# Patient Record
Sex: FEMALE | Race: WHITE | Employment: OTHER | URBAN - METROPOLITAN AREA
[De-identification: names, ages, dates, MRNs, and addresses within clinical notes are randomized per-mention and may not be internally consistent; named-entity substitution may affect disease eponyms.]

---

## 2021-06-01 PROBLEM — I50.9 CONGESTIVE HEART FAILURE OF UNKNOWN ETIOLOGY (HCC): Status: ACTIVE | Noted: 2021-06-01

## 2021-06-02 LAB — TROPONIN I: 0.02 NG/ML (ref 0.01–0.04)

## 2021-06-03 ENCOUNTER — APPOINTMENT (OUTPATIENT)
Dept: ULTRASOUND IMAGING | Age: 81
DRG: 246 | End: 2021-06-03
Attending: FAMILY MEDICINE
Payer: MEDICARE

## 2021-06-03 ENCOUNTER — HOSPITAL ENCOUNTER (INPATIENT)
Age: 81
LOS: 3 days | Discharge: HOME | DRG: 246 | End: 2021-06-06
Attending: FAMILY MEDICINE | Admitting: INTERNAL MEDICINE
Payer: MEDICARE

## 2021-06-03 ENCOUNTER — APPOINTMENT (OUTPATIENT)
Dept: CARDIAC CATH/INVASIVE PROCEDURES | Age: 81
DRG: 246 | End: 2021-06-03
Attending: FAMILY MEDICINE
Payer: MEDICARE

## 2021-06-03 ENCOUNTER — APPOINTMENT (OUTPATIENT)
Dept: GENERAL RADIOLOGY | Age: 81
DRG: 246 | End: 2021-06-03
Attending: FAMILY MEDICINE
Payer: MEDICARE

## 2021-06-03 DIAGNOSIS — I50.43 ACUTE ON CHRONIC COMBINED SYSTOLIC (CONGESTIVE) AND DIASTOLIC (CONGESTIVE) HEART FAILURE (HCC): Primary | ICD-10-CM

## 2021-06-03 PROBLEM — I48.91 ATRIAL FIBRILLATION WITH RAPID VENTRICULAR RESPONSE (HCC): Status: ACTIVE | Noted: 2021-06-03

## 2021-06-03 PROBLEM — I20.0 UNSTABLE ANGINA (HCC): Status: ACTIVE | Noted: 2021-06-03

## 2021-06-03 PROBLEM — E43 SEVERE MALNUTRITION (HCC): Status: ACTIVE | Noted: 2021-06-03

## 2021-06-03 LAB
-: NORMAL
AMORPHOUS: NORMAL
ANION GAP SERPL CALCULATED.3IONS-SCNC: 18 MMOL/L (ref 9–17)
ANION GAP SERPL CALCULATED.3IONS-SCNC: 19 MMOL/L (ref 9–17)
ANION GAP SERPL CALCULATED.3IONS-SCNC: 20 MMOL/L (ref 9–17)
BACTERIA: NORMAL
BILIRUBIN URINE: NEGATIVE
BNP INTERPRETATION: ABNORMAL
BUN BLDV-MCNC: 14 MG/DL (ref 8–23)
BUN BLDV-MCNC: 15 MG/DL (ref 8–23)
BUN BLDV-MCNC: 16 MG/DL (ref 8–23)
BUN/CREAT BLD: ABNORMAL (ref 9–20)
CALCIUM SERPL-MCNC: 7.9 MG/DL (ref 8.6–10.4)
CALCIUM SERPL-MCNC: 7.9 MG/DL (ref 8.6–10.4)
CALCIUM SERPL-MCNC: 8 MG/DL (ref 8.6–10.4)
CASTS UA: NORMAL /LPF (ref 0–8)
CHLORIDE BLD-SCNC: 96 MMOL/L (ref 98–107)
CHLORIDE BLD-SCNC: 97 MMOL/L (ref 98–107)
CHLORIDE BLD-SCNC: 97 MMOL/L (ref 98–107)
CHOLESTEROL/HDL RATIO: 2.1
CHOLESTEROL: 106 MG/DL
CO2: 16 MMOL/L (ref 20–31)
CO2: 17 MMOL/L (ref 20–31)
CO2: 21 MMOL/L (ref 20–31)
COLOR: YELLOW
COMMENT UA: ABNORMAL
CREAT SERPL-MCNC: 1.02 MG/DL (ref 0.5–0.9)
CREAT SERPL-MCNC: 1.12 MG/DL (ref 0.5–0.9)
CREAT SERPL-MCNC: 1.19 MG/DL (ref 0.5–0.9)
CRYSTALS, UA: NORMAL /HPF
EPITHELIAL CELLS UA: NORMAL /HPF (ref 0–5)
FREE KAPPA/LAMBDA RATIO: 0.91 (ref 0.26–1.65)
GFR AFRICAN AMERICAN: 53 ML/MIN
GFR AFRICAN AMERICAN: 57 ML/MIN
GFR AFRICAN AMERICAN: >60 ML/MIN
GFR NON-AFRICAN AMERICAN: 44 ML/MIN
GFR NON-AFRICAN AMERICAN: 47 ML/MIN
GFR NON-AFRICAN AMERICAN: 52 ML/MIN
GFR SERPL CREATININE-BSD FRML MDRD: ABNORMAL ML/MIN/{1.73_M2}
GLUCOSE BLD-MCNC: 121 MG/DL (ref 65–105)
GLUCOSE BLD-MCNC: 122 MG/DL (ref 65–105)
GLUCOSE BLD-MCNC: 129 MG/DL (ref 70–99)
GLUCOSE BLD-MCNC: 145 MG/DL (ref 65–105)
GLUCOSE BLD-MCNC: 145 MG/DL (ref 70–99)
GLUCOSE BLD-MCNC: 182 MG/DL (ref 70–99)
GLUCOSE URINE: NEGATIVE
HCT VFR BLD CALC: 44.8 % (ref 36.3–47.1)
HDLC SERPL-MCNC: 50 MG/DL
HEMOGLOBIN: 13.7 G/DL (ref 11.9–15.1)
KAPPA FREE LIGHT CHAINS QNT: 2.03 MG/DL (ref 0.37–1.94)
KETONES, URINE: NEGATIVE
LAMBDA FREE LIGHT CHAINS QNT: 2.22 MG/DL (ref 0.57–2.63)
LDL CHOLESTEROL: 43 MG/DL (ref 0–130)
LEUKOCYTE ESTERASE, URINE: NEGATIVE
MAGNESIUM: 1.7 MG/DL (ref 1.6–2.6)
MCH RBC QN AUTO: 31.6 PG (ref 25.2–33.5)
MCHC RBC AUTO-ENTMCNC: 30.6 G/DL (ref 28.4–34.8)
MCV RBC AUTO: 103.5 FL (ref 82.6–102.9)
MUCUS: NORMAL
NITRITE, URINE: NEGATIVE
NRBC AUTOMATED: 0 PER 100 WBC
OTHER OBSERVATIONS UA: NORMAL
PARTIAL THROMBOPLASTIN TIME: 25.8 SEC (ref 20.5–30.5)
PARTIAL THROMBOPLASTIN TIME: 32.9 SEC (ref 20.5–30.5)
PDW BLD-RTO: 14.1 % (ref 11.8–14.4)
PH UA: 5 (ref 5–8)
PLATELET # BLD: 205 K/UL (ref 138–453)
PMV BLD AUTO: 9.2 FL (ref 8.1–13.5)
POTASSIUM SERPL-SCNC: 4.8 MMOL/L (ref 3.7–5.3)
POTASSIUM SERPL-SCNC: 6.1 MMOL/L (ref 3.7–5.3)
POTASSIUM SERPL-SCNC: 6.3 MMOL/L (ref 3.7–5.3)
PRO-BNP: 3847 PG/ML
PROTEIN UA: ABNORMAL
RBC # BLD: 4.33 M/UL (ref 3.95–5.11)
RBC UA: NORMAL /HPF (ref 0–4)
RENAL EPITHELIAL, UA: NORMAL /HPF
SODIUM BLD-SCNC: 132 MMOL/L (ref 135–144)
SODIUM BLD-SCNC: 133 MMOL/L (ref 135–144)
SODIUM BLD-SCNC: 136 MMOL/L (ref 135–144)
SPECIFIC GRAVITY UA: 1.01 (ref 1–1.03)
TRICHOMONAS: NORMAL
TRIGL SERPL-MCNC: 63 MG/DL
TROPONIN INTERP: ABNORMAL
TROPONIN T: ABNORMAL NG/ML
TROPONIN, HIGH SENSITIVITY: 36 NG/L (ref 0–14)
TURBIDITY: CLEAR
URINE HGB: ABNORMAL
UROBILINOGEN, URINE: NORMAL
VLDLC SERPL CALC-MCNC: NORMAL MG/DL (ref 1–30)
WBC # BLD: 11.7 K/UL (ref 3.5–11.3)
WBC UA: NORMAL /HPF (ref 0–5)
YEAST: NORMAL

## 2021-06-03 PROCEDURE — 99223 1ST HOSP IP/OBS HIGH 75: CPT | Performed by: FAMILY MEDICINE

## 2021-06-03 PROCEDURE — 2500000003 HC RX 250 WO HCPCS: Performed by: FAMILY MEDICINE

## 2021-06-03 PROCEDURE — 84165 PROTEIN E-PHORESIS SERUM: CPT

## 2021-06-03 PROCEDURE — 84484 ASSAY OF TROPONIN QUANT: CPT

## 2021-06-03 PROCEDURE — 2060000000 HC ICU INTERMEDIATE R&B

## 2021-06-03 PROCEDURE — 6360000002 HC RX W HCPCS: Performed by: FAMILY MEDICINE

## 2021-06-03 PROCEDURE — 71045 X-RAY EXAM CHEST 1 VIEW: CPT

## 2021-06-03 PROCEDURE — 83880 ASSAY OF NATRIURETIC PEPTIDE: CPT

## 2021-06-03 PROCEDURE — 2580000003 HC RX 258: Performed by: INTERNAL MEDICINE

## 2021-06-03 PROCEDURE — 82947 ASSAY GLUCOSE BLOOD QUANT: CPT

## 2021-06-03 PROCEDURE — 1200000000 HC SEMI PRIVATE

## 2021-06-03 PROCEDURE — 6360000002 HC RX W HCPCS: Performed by: INTERNAL MEDICINE

## 2021-06-03 PROCEDURE — 83883 ASSAY NEPHELOMETRY NOT SPEC: CPT

## 2021-06-03 PROCEDURE — 85730 THROMBOPLASTIN TIME PARTIAL: CPT

## 2021-06-03 PROCEDURE — 6370000000 HC RX 637 (ALT 250 FOR IP): Performed by: FAMILY MEDICINE

## 2021-06-03 PROCEDURE — 6370000000 HC RX 637 (ALT 250 FOR IP): Performed by: INTERNAL MEDICINE

## 2021-06-03 PROCEDURE — 81001 URINALYSIS AUTO W/SCOPE: CPT

## 2021-06-03 PROCEDURE — 99222 1ST HOSP IP/OBS MODERATE 55: CPT | Performed by: INTERNAL MEDICINE

## 2021-06-03 PROCEDURE — 76770 US EXAM ABDO BACK WALL COMP: CPT

## 2021-06-03 PROCEDURE — 84155 ASSAY OF PROTEIN SERUM: CPT

## 2021-06-03 PROCEDURE — 80061 LIPID PANEL: CPT

## 2021-06-03 PROCEDURE — 36415 COLL VENOUS BLD VENIPUNCTURE: CPT

## 2021-06-03 PROCEDURE — 85027 COMPLETE CBC AUTOMATED: CPT

## 2021-06-03 PROCEDURE — 80048 BASIC METABOLIC PNL TOTAL CA: CPT

## 2021-06-03 PROCEDURE — 83735 ASSAY OF MAGNESIUM: CPT

## 2021-06-03 PROCEDURE — 2580000003 HC RX 258: Performed by: FAMILY MEDICINE

## 2021-06-03 RX ORDER — HEPARIN SODIUM 10000 [USP'U]/100ML
5-30 INJECTION, SOLUTION INTRAVENOUS CONTINUOUS
Status: DISCONTINUED | OUTPATIENT
Start: 2021-06-03 | End: 2021-06-04

## 2021-06-03 RX ORDER — FLUDROCORTISONE ACETATE 0.1 MG/1
0.1 TABLET ORAL DAILY
COMMUNITY

## 2021-06-03 RX ORDER — NICOTINE POLACRILEX 4 MG
15 LOZENGE BUCCAL PRN
Status: DISCONTINUED | OUTPATIENT
Start: 2021-06-03 | End: 2021-06-06 | Stop reason: HOSPADM

## 2021-06-03 RX ORDER — MECLIZINE HCL 12.5 MG/1
25 TABLET ORAL NIGHTLY
Status: DISCONTINUED | OUTPATIENT
Start: 2021-06-03 | End: 2021-06-06 | Stop reason: HOSPADM

## 2021-06-03 RX ORDER — ACETAMINOPHEN 325 MG/1
650 TABLET ORAL EVERY 6 HOURS PRN
Status: DISCONTINUED | OUTPATIENT
Start: 2021-06-03 | End: 2021-06-06 | Stop reason: HOSPADM

## 2021-06-03 RX ORDER — POLYETHYLENE GLYCOL 3350 17 G/17G
17 POWDER, FOR SOLUTION ORAL DAILY PRN
Status: DISCONTINUED | OUTPATIENT
Start: 2021-06-03 | End: 2021-06-06 | Stop reason: HOSPADM

## 2021-06-03 RX ORDER — CARVEDILOL 6.25 MG/1
6.25 TABLET ORAL 2 TIMES DAILY WITH MEALS
Status: ON HOLD | COMMUNITY
End: 2021-06-06 | Stop reason: HOSPADM

## 2021-06-03 RX ORDER — FUROSEMIDE 10 MG/ML
20 INJECTION INTRAMUSCULAR; INTRAVENOUS ONCE
Status: COMPLETED | OUTPATIENT
Start: 2021-06-03 | End: 2021-06-03

## 2021-06-03 RX ORDER — NICOTINE 21 MG/24HR
1 PATCH, TRANSDERMAL 24 HOURS TRANSDERMAL DAILY PRN
Status: DISCONTINUED | OUTPATIENT
Start: 2021-06-03 | End: 2021-06-06 | Stop reason: HOSPADM

## 2021-06-03 RX ORDER — PRAVASTATIN SODIUM 10 MG
10 TABLET ORAL DAILY
COMMUNITY

## 2021-06-03 RX ORDER — HEPARIN SODIUM 1000 [USP'U]/ML
60 INJECTION, SOLUTION INTRAVENOUS; SUBCUTANEOUS PRN
Status: DISCONTINUED | OUTPATIENT
Start: 2021-06-03 | End: 2021-06-04

## 2021-06-03 RX ORDER — SODIUM CHLORIDE 9 MG/ML
25 INJECTION, SOLUTION INTRAVENOUS PRN
Status: DISCONTINUED | OUTPATIENT
Start: 2021-06-03 | End: 2021-06-06 | Stop reason: HOSPADM

## 2021-06-03 RX ORDER — FUROSEMIDE 10 MG/ML
40 INJECTION INTRAMUSCULAR; INTRAVENOUS 2 TIMES DAILY
Status: DISCONTINUED | OUTPATIENT
Start: 2021-06-03 | End: 2021-06-03

## 2021-06-03 RX ORDER — DEXTROSE MONOHYDRATE 50 MG/ML
100 INJECTION, SOLUTION INTRAVENOUS PRN
Status: DISCONTINUED | OUTPATIENT
Start: 2021-06-03 | End: 2021-06-06 | Stop reason: HOSPADM

## 2021-06-03 RX ORDER — LISINOPRIL 5 MG/1
5 TABLET ORAL DAILY
Status: DISCONTINUED | OUTPATIENT
Start: 2021-06-04 | End: 2021-06-03

## 2021-06-03 RX ORDER — SODIUM CHLORIDE 9 MG/ML
INJECTION, SOLUTION INTRAVENOUS CONTINUOUS
Status: DISCONTINUED | OUTPATIENT
Start: 2021-06-03 | End: 2021-06-05

## 2021-06-03 RX ORDER — ASPIRIN 325 MG
325 TABLET ORAL DAILY
Status: ON HOLD | COMMUNITY
End: 2021-06-06 | Stop reason: HOSPADM

## 2021-06-03 RX ORDER — SODIUM CHLORIDE 0.9 % (FLUSH) 0.9 %
5-40 SYRINGE (ML) INJECTION EVERY 12 HOURS SCHEDULED
Status: DISCONTINUED | OUTPATIENT
Start: 2021-06-03 | End: 2021-06-06 | Stop reason: HOSPADM

## 2021-06-03 RX ORDER — HEPARIN SODIUM 1000 [USP'U]/ML
60 INJECTION, SOLUTION INTRAVENOUS; SUBCUTANEOUS ONCE
Status: COMPLETED | OUTPATIENT
Start: 2021-06-03 | End: 2021-06-03

## 2021-06-03 RX ORDER — PRAVASTATIN SODIUM 20 MG
10 TABLET ORAL DAILY
Status: DISCONTINUED | OUTPATIENT
Start: 2021-06-03 | End: 2021-06-06 | Stop reason: HOSPADM

## 2021-06-03 RX ORDER — DIGOXIN 0.25 MG/ML
125 INJECTION INTRAMUSCULAR; INTRAVENOUS DAILY
Status: DISCONTINUED | OUTPATIENT
Start: 2021-06-03 | End: 2021-06-05

## 2021-06-03 RX ORDER — ASPIRIN 325 MG
325 TABLET ORAL DAILY
Status: DISCONTINUED | OUTPATIENT
Start: 2021-06-03 | End: 2021-06-04

## 2021-06-03 RX ORDER — PROMETHAZINE HYDROCHLORIDE 12.5 MG/1
12.5 TABLET ORAL EVERY 6 HOURS PRN
Status: DISCONTINUED | OUTPATIENT
Start: 2021-06-03 | End: 2021-06-06 | Stop reason: HOSPADM

## 2021-06-03 RX ORDER — DEXTROSE MONOHYDRATE 25 G/50ML
12.5 INJECTION, SOLUTION INTRAVENOUS PRN
Status: DISCONTINUED | OUTPATIENT
Start: 2021-06-03 | End: 2021-06-06 | Stop reason: HOSPADM

## 2021-06-03 RX ORDER — MECLIZINE HYDROCHLORIDE 25 MG/1
25 TABLET ORAL NIGHTLY
COMMUNITY

## 2021-06-03 RX ORDER — OMEPRAZOLE 20 MG/1
20 CAPSULE, DELAYED RELEASE ORAL DAILY
COMMUNITY

## 2021-06-03 RX ORDER — CARVEDILOL 3.12 MG/1
3.12 TABLET ORAL 2 TIMES DAILY WITH MEALS
Status: DISCONTINUED | OUTPATIENT
Start: 2021-06-04 | End: 2021-06-06 | Stop reason: HOSPADM

## 2021-06-03 RX ORDER — FLUDROCORTISONE ACETATE 0.1 MG/1
0.1 TABLET ORAL DAILY
Status: DISCONTINUED | OUTPATIENT
Start: 2021-06-03 | End: 2021-06-06 | Stop reason: HOSPADM

## 2021-06-03 RX ORDER — SODIUM POLYSTYRENE SULFONATE 15 G/60ML
15 SUSPENSION ORAL; RECTAL ONCE
Status: COMPLETED | OUTPATIENT
Start: 2021-06-03 | End: 2021-06-03

## 2021-06-03 RX ORDER — FAMOTIDINE 20 MG/1
20 TABLET, FILM COATED ORAL DAILY
Status: DISCONTINUED | OUTPATIENT
Start: 2021-06-03 | End: 2021-06-06 | Stop reason: HOSPADM

## 2021-06-03 RX ORDER — ACETAMINOPHEN 650 MG/1
650 SUPPOSITORY RECTAL EVERY 6 HOURS PRN
Status: DISCONTINUED | OUTPATIENT
Start: 2021-06-03 | End: 2021-06-06 | Stop reason: HOSPADM

## 2021-06-03 RX ORDER — DEXTROSE MONOHYDRATE 25 G/50ML
25 INJECTION, SOLUTION INTRAVENOUS ONCE
Status: COMPLETED | OUTPATIENT
Start: 2021-06-03 | End: 2021-06-03

## 2021-06-03 RX ORDER — PANTOPRAZOLE SODIUM 40 MG/1
40 TABLET, DELAYED RELEASE ORAL
Status: DISCONTINUED | OUTPATIENT
Start: 2021-06-04 | End: 2021-06-06 | Stop reason: HOSPADM

## 2021-06-03 RX ORDER — DEXTROSE MONOHYDRATE 25 G/50ML
25 INJECTION, SOLUTION INTRAVENOUS ONCE
Status: DISCONTINUED | OUTPATIENT
Start: 2021-06-03 | End: 2021-06-06 | Stop reason: HOSPADM

## 2021-06-03 RX ORDER — ONDANSETRON 2 MG/ML
4 INJECTION INTRAMUSCULAR; INTRAVENOUS EVERY 6 HOURS PRN
Status: DISCONTINUED | OUTPATIENT
Start: 2021-06-03 | End: 2021-06-06 | Stop reason: HOSPADM

## 2021-06-03 RX ORDER — SODIUM CHLORIDE 0.9 % (FLUSH) 0.9 %
10 SYRINGE (ML) INJECTION PRN
Status: DISCONTINUED | OUTPATIENT
Start: 2021-06-03 | End: 2021-06-06 | Stop reason: HOSPADM

## 2021-06-03 RX ORDER — HEPARIN SODIUM 1000 [USP'U]/ML
30 INJECTION, SOLUTION INTRAVENOUS; SUBCUTANEOUS PRN
Status: DISCONTINUED | OUTPATIENT
Start: 2021-06-03 | End: 2021-06-04

## 2021-06-03 RX ADMIN — HEPARIN SODIUM 1470 UNITS: 1000 INJECTION INTRAVENOUS; SUBCUTANEOUS at 18:17

## 2021-06-03 RX ADMIN — HEPARIN SODIUM AND DEXTROSE 12 UNITS/KG/HR: 10000; 5 INJECTION INTRAVENOUS at 11:31

## 2021-06-03 RX ADMIN — ENOXAPARIN SODIUM 40 MG: 40 INJECTION, SOLUTION INTRAVENOUS; SUBCUTANEOUS at 09:45

## 2021-06-03 RX ADMIN — DEXTROSE MONOHYDRATE 25 G: 25 INJECTION, SOLUTION INTRAVENOUS at 11:44

## 2021-06-03 RX ADMIN — HEPARIN SODIUM 2940 UNITS: 1000 INJECTION INTRAVENOUS; SUBCUTANEOUS at 11:22

## 2021-06-03 RX ADMIN — FAMOTIDINE 20 MG: 20 TABLET, FILM COATED ORAL at 09:45

## 2021-06-03 RX ADMIN — AMIODARONE HYDROCHLORIDE 0.5 MG/MIN: 150 INJECTION, SOLUTION INTRAVENOUS at 10:02

## 2021-06-03 RX ADMIN — INSULIN HUMAN 10 UNITS: 100 INJECTION, SOLUTION PARENTERAL at 11:45

## 2021-06-03 RX ADMIN — PRAVASTATIN SODIUM 10 MG: 20 TABLET ORAL at 17:22

## 2021-06-03 RX ADMIN — SODIUM CHLORIDE: 9 INJECTION, SOLUTION INTRAVENOUS at 11:22

## 2021-06-03 RX ADMIN — SODIUM CHLORIDE, PRESERVATIVE FREE 10 ML: 5 INJECTION INTRAVENOUS at 20:08

## 2021-06-03 RX ADMIN — FUROSEMIDE 40 MG: 10 INJECTION, SOLUTION INTRAMUSCULAR; INTRAVENOUS at 09:45

## 2021-06-03 RX ADMIN — ASPIRIN 325 MG: 325 TABLET ORAL at 17:21

## 2021-06-03 RX ADMIN — FLUDROCORTISONE ACETATE 0.1 MG: 0.1 TABLET ORAL at 17:21

## 2021-06-03 RX ADMIN — SODIUM CHLORIDE, PRESERVATIVE FREE 10 ML: 5 INJECTION INTRAVENOUS at 09:46

## 2021-06-03 RX ADMIN — SODIUM POLYSTYRENE SULFONATE 15 G: 15 SUSPENSION ORAL; RECTAL at 11:45

## 2021-06-03 RX ADMIN — AMIODARONE HYDROCHLORIDE 1 MG/MIN: 50 INJECTION, SOLUTION INTRAVENOUS at 04:39

## 2021-06-03 RX ADMIN — ONDANSETRON 4 MG: 2 INJECTION INTRAMUSCULAR; INTRAVENOUS at 06:35

## 2021-06-03 RX ADMIN — FUROSEMIDE 20 MG: 10 INJECTION, SOLUTION INTRAMUSCULAR; INTRAVENOUS at 17:24

## 2021-06-03 RX ADMIN — MECLIZINE HYDROCHLORIDE 25 MG: 12.5 TABLET, FILM COATED ORAL at 20:07

## 2021-06-03 ASSESSMENT — ENCOUNTER SYMPTOMS
CONSTIPATION: 0
ABDOMINAL PAIN: 0
VOMITING: 0
BLOOD IN STOOL: 0
CHEST TIGHTNESS: 0
RHINORRHEA: 0
WHEEZING: 0
NAUSEA: 0
DIARRHEA: 0
COUGH: 0
SHORTNESS OF BREATH: 1
TACHYPNEA: 1

## 2021-06-03 NOTE — H&P
[facility]  Office: [address], DO, Judson Gila, DO, Francesca Dejesus, DO, Floridalma Chen Blood, DO, Melanie Salmon MD, Ladonna Elizabeth MD, Aguila Howard MD, Jeb Kidd MD, Judy Hinds MD, Sunil Celestin MD, Balta Morrissey MD, Christopher Pantoja MD, Fady Hughes, DO, Carmelo Edwards MD, Allegra Bacon, DO, Cordelia Barthel, MD,  Bhargav Aly, DO, Keke Lainez MD, Rob Madrid MD, Jean Paul Ramos MD, Valerie Kearney MD, Dulce Spencer, [facility], [facility] Jayme, CNP, Reanna Gutierrez, CNP, Dandy Joe, CNS, Merna Bey, CNP, Jin Haynes, CNP, Enedina Forde, CNP, Angeline Franco, CNP, Laverne Hsu, CNP, Yohana Self PA-C, Marya Chang, [facility], Sekou Carlos, CNP, Hermelindo Terry, CNP, Clement Magaña, CNP, Spring Maza, CNP, Jerri Dyer, CNP, [facility], [address] [place] [address]      HISTORY AND PHYSICAL EXAMINATION            Date:   6/3/2021  Patient name:  Roderick Vallejo  Date of admission:  6/3/2021  1:23 AM  MRN:   [id number]  Account:  [de-identified]  YOB: 1940  PCP:    Tammy Segovia DO  Room:   2018/2018-01  Code Status:    DNR-CCA    Chief Complaint:     Breathing difficulty    History Obtained From:     patient, electronic medical record    History of Present Illness: The patient is a 79 y.o. Non-/non  female who presents with breathing difficulty and she is admitted to the hospital for the management of  Atrial fibrillation with rapid ventricular response ([facility] [address] Patient was transferred from [facility] where she had been admitted for 5 days after presenting with difficulty with difficulty in breathing. Patient reported that she had not been feeling good for about a week and had decreased activity, fatigue. She also reported chest pain on mild exertion. Patient denied any fever, cough, expectoration, nausea, vomiting.   She has known history of CAD with multiple stents, chronic systolic heart failure with EF 25 to oropharyngeal exudate. Eyes:      General: No scleral icterus. Conjunctiva/sclera: Conjunctivae normal.      Pupils: Pupils are equal, round, and reactive to light. Neck:      Thyroid: No thyromegaly. Vascular: No JVD. Cardiovascular:      Rate and Rhythm: Normal rate and regular rhythm. Pulses:           Dorsalis pedis pulses are 2+ on the right side and 2+ on the left side. Heart sounds: Normal heart sounds. No murmur heard. Pulmonary:      Effort: Pulmonary effort is normal.      Breath sounds: Normal breath sounds. No wheezing or rales. Abdominal:      Palpations: Abdomen is soft. There is no mass. Tenderness: There is no abdominal tenderness. Musculoskeletal:      Cervical back: Full passive range of motion without pain and neck supple. Lymphadenopathy:      Head:      Right side of head: No submandibular adenopathy. Left side of head: No submandibular adenopathy. Cervical: No cervical adenopathy. Skin:     General: Skin is warm. Neurological:      Mental Status: She is alert and oriented to person, place, and time. Motor: No tremor. Psychiatric:         Behavior: Behavior is cooperative.          Investigations:      Laboratory Testing:  Recent Results (from the past 24 hour(s))   Troponin    Collection Time: 06/02/21 10:35 PM   Result Value Ref Range    Troponin I 0.016 0.010 - 0.04 ng/mL   Brain Natriuretic Peptide    Collection Time: 06/03/21  7:08 AM   Result Value Ref Range    Pro-BNP 3,847 (H) <300 pg/mL    BNP Interpretation Pro-BNP Reference Range:    CBC    Collection Time: 06/03/21  7:08 AM   Result Value Ref Range    WBC 11.7 (H) 3.5 - 11.3 k/uL    RBC 4.33 3.95 - 5.11 m/uL    Hemoglobin 13.7 11.9 - 15.1 g/dL    Hematocrit 44.8 36.3 - 47.1 %    .5 (H) 82.6 - 102.9 fL    MCH 31.6 25.2 - 33.5 pg    MCHC 30.6 28.4 - 34.8 g/dL    RDW 14.1 11.8 - 14.4 %    Platelets 417 503 - 595 k/uL    MPV 9.2 8.1 - 13.5 fL    NRBC Automated 0.0 0.0 per 100 WBC   Lipid panel - fasting    Collection Time: 06/03/21  7:08 AM   Result Value Ref Range    Cholesterol 106 <200 mg/dL    HDL 50 >40 mg/dL    LDL Cholesterol 43 0 - 130 mg/dL    Chol/HDL Ratio 2.1 <5    Triglycerides 63 <150 mg/dL    VLDL NOT REPORTED 1 - 30 mg/dL   Magnesium    Collection Time: 06/03/21  7:08 AM   Result Value Ref Range    Magnesium 1.7 1.6 - 2.6 mg/dL   BASIC METABOLIC PANEL    Collection Time: 06/03/21  7:08 AM   Result Value Ref Range    Glucose 182 (H) 70 - 99 mg/dL    BUN 14 8 - 23 mg/dL    CREATININE 1.12 (H) 0.50 - 0.90 mg/dL    Bun/Cre Ratio NOT REPORTED 9 - 20    Calcium 8.0 (L) 8.6 - 10.4 mg/dL    Sodium 133 (L) 135 - 144 mmol/L    Potassium 6.1 (HH) 3.7 - 5.3 mmol/L    Chloride 97 (L) 98 - 107 mmol/L    CO2 16 (L) 20 - 31 mmol/L    Anion Gap 20 (H) 9 - 17 mmol/L    GFR Non-African American 47 (L) >60 mL/min    GFR  57 (L) >60 mL/min    GFR Comment          GFR Staging NOT REPORTED    BASIC METABOLIC PANEL    Collection Time: 06/03/21 10:38 AM   Result Value Ref Range    Glucose 145 (H) 70 - 99 mg/dL    BUN 15 8 - 23 mg/dL    CREATININE 1.02 (H) 0.50 - 0.90 mg/dL    Bun/Cre Ratio NOT REPORTED 9 - 20    Calcium 7.9 (L) 8.6 - 10.4 mg/dL    Sodium 132 (L) 135 - 144 mmol/L    Potassium 6.3 (HH) 3.7 - 5.3 mmol/L    Chloride 96 (L) 98 - 107 mmol/L    CO2 17 (L) 20 - 31 mmol/L    Anion Gap 19 (H) 9 - 17 mmol/L    GFR Non-African American 52 (L) >60 mL/min    GFR African American >60 >60 mL/min    GFR Comment          GFR Staging NOT REPORTED    APTT    Collection Time: 06/03/21 10:38 AM   Result Value Ref Range    PTT 25.8 20.5 - 30.5 sec   Troponin    Collection Time: 06/03/21 10:38 AM   Result Value Ref Range    Troponin, High Sensitivity 36 (H) 0 - 14 ng/L    Troponin T NOT REPORTED <0.03 ng/mL    Troponin Interp NOT REPORTED    POC Glucose Fingerstick    Collection Time: 06/03/21 11:40 AM   Result Value Ref Range    POC Glucose 122 (H) 65 - 105 mg/dL Imaging/Diagonstics:    No results found. Assessment :      Primary Problem  Atrial fibrillation with rapid ventricular response [facility])    Active Hospital Problems    Diagnosis Date Noted    Atrial fibrillation with rapid ventricular response (HCC) [I48.91] 06/03/2021    Unstable angina ([facility] [address] [I20.0] 06/03/2021    CAD (coronary artery disease) [I25.10]     Hypertension [I10]     Hyperlipidemia [E78.5]     Hypothyroidism [E03.9]     Type 2 diabetes mellitus ([facility] [address] [E11.9]     Acute on chronic combined systolic (congestive) and diastolic (congestive) heart failure [facility] [I50.43] 06/01/2021       Plan:     Patient status Admit as inpatient in the  Progressive Unit/Step down    1. Acute decompensated systolic heart failure secondary to A. fib RVR -heart rate controlled with amiodarone infusion. Continue heparin infusion. Cardiology consult. Plan for heart cath. Does not appear in acute fluid overload. Patient has decreased urine output and has Veloz catheter in place will start on normal saline at 50 mill per hour. 2. Unstable angina -chest pain-free at this time. Continue heparin. Plan for heart cath . continue aspirin, Coreg. 3. CAD with multiple stents -continue Coreg, Lipitor, aspirin. 4. Hyperlipidemia -statins  5. Hypothyroidism-check TSH. 6. Diet-controlled diabetes mellitus-monitor blood sugar. Humalog as needed. Check A1c.  7. Chronic hypertension on Florinef -   8. Acute hyperkalemia -Kayexalate, insulin with dextrose. Monitor potassium level. Consultations:   IP CONSULT TO HEART FAILURE NURSE/COORDINATOR  IP CONSULT TO DIETITIAN  IP CONSULT TO CARDIOLOGY  IP CONSULT TO IV TEAM    Patient is admitted as inpatient status because of co-morbidities listed above, severity of signs and symptoms as outlined, requirement for current medical therapies and most importantly because of direct risk to patient if care not provided in a hospital setting.     Angelique Pruett MD  6/3/2021    Copy sent to Dr. Bryanna Rouse DO    (Please note that portions of this note were completed with a voice recognition program. Efforts were made to edit the dictations but occasionally words are mis-transcribed.)

## 2021-06-03 NOTE — PROGRESS NOTES
Comprehensive Nutrition Assessment    Type and Reason for Visit:  Initial (PNS: Wt loss, poor appetite; Consult - Cardiac Ed)    Nutrition Recommendations/Plan:   -Continue NPO status   -Start diet as able   -Suggest nepro supplement QD per pt request   -Please obtain actual wt as able   -Will monitor nutrition progression     Nutrition Assessment:  Pt admitted d/t acute decompensated systolic HF 2/2 to BEN navas RVR. Pt stated that her weight is usually around 100 lbs. Writer weighed pt on bed-scale at 115 lbs - pt denies ever weighing this much. No wt hx in EMR. Pt stated that her appetite has always been poor - which has become her baseline. Pt meets the criteria for severe malnutrition. Pt agreed to one nutritional supplement on her meal tray when her diet advances. Cardiac diet ed not appropriate at this time. Goal of wt stabilization/gain as medically able. Will monitor. Malnutrition Assessment:  Malnutrition Status:  Severe malnutrition    Context:  Chronic Illness     Findings of the 6 clinical characteristics of malnutrition:  Energy Intake:   (<50% of est'd needs > 1 mo)  Weight Loss:  Unable to assess     Body Fat Loss:  7 - Severe body fat loss Orbital, Triceps   Muscle Mass Loss:  7 - Severe muscle mass loss Temples (temporalis), Clavicles (pectoralis & deltoids), Scapula (trapezius)  Fluid Accumulation:  No significant fluid accumulation     Strength:  Not Performed    Estimated Daily Nutrient Needs:  Energy (kcal):  30-35 ~> 2803-5083 kcals/d; Weight Used for Energy Requirements:  Usual     Protein (g):  1.3-1.5 gm/kg ~> 59-68 gms/d; Weight Used for Protein Requirements:  Usual        Fluid (ml/day):  30-35 ~> 5130-7251 mLs/d; Method Used for Fluid Requirements:  1 ml/kcal      Nutrition Related Findings:  active bowel sounds;  Na 132, K 6.3, glucose 145; meds reviewed      Wounds:  None       Current Nutrition Therapies:    Diet NPO    Anthropometric Measures:  · Height: 5' 3\" (160 cm)  · Current Body Weight: 115 lb (52.2 kg) (writer weighed pt using bed-scale)   · Usual Body Weight: 100 lb (45.4 kg) (per pt)     · Ideal Body Weight: 115 lbs; % Ideal Body Weight 100 %   · BMI: 20.4  · BMI Categories: Underweight (BMI less than 22) age over 72       Nutrition Diagnosis:   · Severe malnutrition, In context of chronic illness related to cardiac dysfunction (advanced age) as evidenced by poor intake prior to admission, NPO or clear liquid status due to medical condition, severe loss of subcutaneous fat, severe muscle loss (Need for ONS)      Nutrition Interventions:   Food and/or Nutrient Delivery:  Continue NPO (Start diet as able; suggest nepro supplements QD per pt request)  Nutrition Education/Counseling:  Education not appropriate   Coordination of Nutrition Care:  Continue to monitor while inpatient    Goals: Set   Start diet within 24-72 hrs       Nutrition Monitoring and Evaluation:   Food/Nutrient Intake Outcomes:  Diet Advancement/Tolerance  Physical Signs/Symptoms Outcomes:  Biochemical Data, Nutrition Focused Physical Findings, Skin, Weight, GI Status, Fluid Status or Edema     Discharge Planning:     Too soon to determine     Electronically signed by Emigdio Granados RD, LD on 6/3/21 at 12:58 PM EDT    Contact: [phone]

## 2021-06-03 NOTE — CARE COORDINATION
Case Management Initial Discharge Plan  Sonu Winters             Met with:patient to discuss discharge plans. Information verified: address, contacts, phone number, , insurance Yes    Emergency Contact/Next of Kin name & number:  Tobias Heading [phone]    PCP: Caden Miranda  Date of last visit: 2 months ago    Insurance Provider: Medicare    Discharge Planning    Living Arrangements:  Spouse/Significant Other   Support Systems:  Spouse/Significant Other, Children    Home has 2 stories  3 stairs to climb to get into front door  Location of bedroom/bathroom in home main level    Patient able to perform ADL's:Independent    Current Services (outpatient & in home) none  DME equipment: none  DME provider: n/a    Receiving oral anticoagulation therapy? No        Potential Assistance Needed:  N/A    Patient agreeable to home care: No  [place] of choice provided:  n/a    Prior SNF/Rehab Placement and Facility: None  Agreeable to SNF/Rehab: No  [place] of choice provided: n/a     Evaluation: n/a    Expected Discharge date:  21    Patient expects to be discharged to:  home  Follow Up Appointment: Best Day/ Time:      Transportation provider: rick  Transportation arrangements needed for discharge: No    Readmission Risk              Risk of Unplanned Readmission:  13             Does patient have a readmission risk score greater than 14?: No  If yes, follow-up appointment must be made within 7 days of discharge.      Goals of Care: breathe easier      Discharge Plan: home with     [facility]          Electronically signed by Dameon Cisse RN on 6/3/21 at 12:56 PM EDT

## 2021-06-03 NOTE — CONSULTS
Nephrology Consult Note    Reason for Consult: Acute renal injury, hyperkalemia  Requesting Physician: fred Gill    Chief Complaint: Shortness of breath and weakness     History Obtained From:  patient, family member , electronic medical record    History of Present Illness: This is a 79 y.o. female who presented to the [facility] for evaluation of weakness and progressive shortness of breath. She tells me she had not been feeling well for about 7 to 10 days and then she started to develop progressive shortness of breath which worsened to the point of orthopnea. She went to [facility] and was noted to have A. fib with RVR and also congestive heart failure. She did receive IV diuretics and was noted to be somewhat hypokalemic following diuretic use. She did receive oral and IV potassium supplementation. I reviewed her records, her creatinine baseline seems to be normal at 0.7. Her creatinine has gone up to 1.1 therefore nephrology consult requested. In addition, her potassium here was noted to be 6.1, repeat was 6.3, medically treated, repeat potassium is now down to 4.8. Records also show that she underwent CT angiogram of the chest to rule out PE which was negative. Her EKG did show A. fib with RVR. She did receive IV amiodarone. She also received I believe a dose of dig. Cardiac echo was performed which showed definite change in her LV ejection fraction and she was transferred to [facility] for additional care and possible cardiac cath. When she arrived here her labs were obviously off as described above and her cardiac cath has been postponed. She has never had any kidney problems to the best of her knowledge in the past.  She is an ex-smoker  She does have history of dyslipidemia, history of A. fib, chronic hypertension, hypothyroidism and sciatica.  On questioning she denied any history of chronic NSAID use           There is no history of blood or bone marrow disorders. There is no hx of jaundice or hepatitis or sexually transmitted disease. Pt has no hx of collagen vascular disease or vasculitis. No history of dysuria or frequency. No recent procedures involving IV contrast.   There is no hx of paraprotein disease. No hx of recurrent UTI , incontinence or recurrent nephrolithiasis. Medications reviewed.  And is allergic to lisinopril    Past Medical History:        Diagnosis Date    Adhesive capsulitis left shoulder     Atrial fibrillation (HCC)     CAD (coronary artery disease)     CHF (congestive heart failure) (HCC)     Fracture of proximal end of left humerus 11/17/2019    Hyperlipidemia     Hypertension     Hypothyroidism     Type 2 diabetes mellitus ([facility] [address]        Past Surgical History:        Procedure Laterality Date    CHOLECYSTECTOMY      FEMUR FRACTURE SURGERY Right 02/14/2015    FEMUR FRACTURE SURGERY Left 07/06/2015    PARTIAL HYSTERECTOMY         Current Medications:    sodium chloride flush 0.9 % injection 5-40 mL, 2 times per day  sodium chloride flush 0.9 % injection 10 mL, PRN  0.9 % sodium chloride infusion, PRN  nicotine (NICODERM CQ) 21 MG/24HR 1 patch, Daily PRN  promethazine (PHENERGAN) tablet 12.5 mg, Q6H PRN   Or  ondansetron (ZOFRAN) injection 4 mg, Q6H PRN  polyethylene glycol (GLYCOLAX) packet 17 g, Daily PRN  acetaminophen (TYLENOL) tablet 650 mg, Q6H PRN   Or  acetaminophen (TYLENOL) suppository 650 mg, Q6H PRN  [START ON 6/4/2021] carvedilol (COREG) tablet 3.125 mg, BID WC  [Held by provider] digoxin (LANOXIN) injection 125 mcg, Daily  famotidine (PEPCID) tablet 20 mg, Daily  insulin lispro (HUMALOG) injection vial 0-12 Units, TID WC  insulin lispro (HUMALOG) injection vial 0-6 Units, Nightly  amiodarone (CORDARONE) 450 mg in dextrose 5 % 250 mL infusion, Continuous  insulin regular (HUMULIN R;NOVOLIN R) injection 10 Units, Once  glucose (GLUTOSE) 40 % oral gel 15 g, PRN  dextrose 50 % IV solution, PRN  glucagon (rDNA) injection 1 mg, PRN  dextrose 5 % solution, PRN  dextrose 50 % IV solution, Once  glucose (GLUTOSE) 40 % oral gel 15 g, PRN  dextrose 50 % IV solution, PRN  glucagon (rDNA) injection 1 mg, PRN  dextrose 5 % solution, PRN  heparin (porcine) injection 2,940 Units, PRN  heparin (porcine) injection 1,470 Units, PRN  heparin 25,000 units in dextrose 5% 250 mL (premix) infusion, Continuous  0.9 % sodium chloride infusion, Continuous  aspirin tablet 325 mg, Daily  fludrocortisone (FLORINEF) tablet 0.1 mg, Daily  meclizine (ANTIVERT) tablet 25 mg, Nightly  [START ON 6/4/2021] pantoprazole (PROTONIX) tablet 40 mg, QAM AC  pravastatin (PRAVACHOL) tablet 10 mg, Daily        Allergies:  Fosamax [alendronate], Lisinopril, and Pioglitazone    Social History:   Social History     Socioeconomic History    Marital status:      Spouse name: Not on file    Number of children: Not on file    Years of education: Not on file    Highest education level: Not on file   Occupational History    Not on file   Tobacco Use    Smoking status: Former Smoker     Packs/day: 1.00     Years: 20.00     Pack years: 20.00    Smokeless tobacco: Never Used   Substance and Sexual Activity    Alcohol use: Not on file    Drug use: Not on file    Sexual activity: Not on file   Other Topics Concern    Not on file   Social History Narrative    Not on file     Social Determinants of Health     Financial Resource Strain:     Difficulty of Paying Living Expenses:    Food Insecurity:     Worried About Running Out of Food in the Last Year:     Ran Out of Food in the Last Year:    Transportation Needs:     Lack of Transportation (Medical):      Lack of Transportation (Non-Medical):    Physical Activity:     Days of Exercise per Week:     Minutes of Exercise per Session:    Stress:     Feeling of Stress :    Social Connections:     Frequency of Communication with Friends and Family:     Frequency of Social Gatherings with Friends and Family:     Attends Ten[employer] Services:     Active Member of Clubs or Organizations:     Attends Club or Organization Meetings:     Marital Status:    Intimate Partner Violence:     Fear of Current or Ex-Partner:     Emotionally Abused:     Physically Abused:     Sexually Abused:        Family History:   Family History   Problem Relation Age of Onset    Heart Disease Father        Review of Systems:    Constitutional: No fever, no chills, no lethargy, + weakness. HEENT:  No headache, otalgia, itchy eyes, nasal discharge or sore throat. Cardiac:  No chest pain, +dyspnea, +orthopnea   Chest:              No cough, phlegm or wheezing. Abdomen:  No abdominal pain, nausea or vomiting. Neuro:  No focal weakness, abnormal movements orseizure like activity. Skin:   No rashes, no itching. :   No hematuria, no pyuria, no dysuria, no flank pain. Extremities:  No swelling or joint pains. Objective:  CURRENT TEMPERATURE:  Temp: 95 °F (35 °C)  MAXIMUM TEMPERATURE OVER 24HRS:  Temp (24hrs), Av.6 °F (35.9 °C), Min:95 °F (35 °C), Max:98.1 °F (36.7 °C)    CURRENT RESPIRATORY RATE:  Resp: 20  CURRENT PULSE:  Pulse: 92  CURRENT BLOOD PRESSURE:  BP: 123/71  24HR BLOOD PRESSURE RANGE:  Systolic (29PXM), DLB:521 , Min:108 , KWQ:865   ; Diastolic (91TBX), OAT:39, Min:66, Max:80    24HR INTAKE/OUTPUT:      Intake/Output Summary (Last 24 hours) at 6/3/2021 1445  Last data filed at 6/3/2021 0551  Gross per 24 hour   Intake 240 ml   Output 100 ml   Net 140 ml     Patient Vitals for the past 96 hrs (Last 3 readings):   Weight   21 0200 108 lb (49 kg)     Physical Exam:  General appearance:Awake, alert, in no acute distress  Skin: warm and dry, no rash or erythema  Eyes: conjunctivae normal and sclera anicteric  ENT: :no thrush no pharyngeal congestion    Neck: no carotid bruit ,,no carotid Lymphadenopathy, noThyromegaly   Pulmonary: no wheezing or rhonchi. No rales heard.   Cardiovascular: Somewhat irregular  S1 this evening again 40 mg IV  6. It would be prudent to evaluate her labs tomorrow morning before she is subjected to additional contrast exposure. In ideal circumstance we should allow her creatinine to improve a little before we do cardiac cath unless emergent. 7. Following   Thank you for the consultation. Please do not hesitate to call with questions.     This note is created with the assistance of a speech-recognition program. While intending to generate a document that actually reflects the content of the visit, no guarantees can be provided that every mistake has been identified and corrected by editing    Electronically signed by Henna Gonzalez MD on 6/3/2021 at 2:45 PM

## 2021-06-03 NOTE — CONSULTS
Attestation signed by      Attending Physician Statement:    I have discussed the care of  Roderick Vallejo , including pertinent history and exam findings, with the Cardiology fellow/resident. I have seen and examined the patient and the key elements of all parts of the encounter have been performed by me. I agree with the assessment, plan and orders as documented by the fellow/resident, after I modified exam findings and plan of treatments, and the final version is my approved version of the assessment. Additional Comments:   Afib , RVR better rate now , on Heparin   H/O stent with worsening angina   CHF with recent reduced LVEF   Plan for CHF compensation , extra Lasix   Hyperkalemia   , got Insulin   CKD   willl get nephrology   Plan for cath and MAHI/CV in am   Will dc amiodarone    [facility] Cardiology Cardiology    Consult / H&P               Today's Date: 6/3/2021  Patient Name: Roderick Vallejo  Date of admission: 6/3/2021  1:23 AM  Patient's age: 79 y.o., 1940  Admission Dx: Congestive heart failure of unknown etiology ([facility] [address] [I50.9]    Reason for Consult:  CHF, Afib     Requesting Physician: Aguila Howard MD    CHIEF COMPLAINT:  Shortness of breath     History Obtained From:  patient, electronic medical record    HISTORY OF PRESENT ILLNESS:    Roderick Vallejo 79 y.o. female presented as transfer from [facility]. She presented there with progressive shortness of breath, fatigue and weakness for 2 weeks. She also had associated lightheadedness. She denies chest pain associated with it. She was found to be in  Afib with RVR there. She was started on lovenox, amiodarone and lasix for acute CHF. Her ECHO showed EF 35-40% which was reduced from before with regional wall motion abnormalities. She is transferred for cardiac cath.      Past Medical History:   has a past medical history of Atrial fibrillation ([facility] [address] CAD (coronary artery disease), CHF (congestive heart failure) ([facility] [address] and Hypertension. Past Surgical History:   has no past surgical history on file. Home Medications:    Prior to Admission medications    Medication Sig Start Date End Date Taking?  Authorizing Provider   aspirin 325 MG tablet Take 325 mg by mouth daily   Yes Historical Provider, MD      Current Facility-Administered Medications: sodium chloride flush 0.9 % injection 5-40 mL, 5-40 mL, Intravenous, 2 times per day  sodium chloride flush 0.9 % injection 10 mL, 10 mL, Intravenous, PRN  0.9 % sodium chloride infusion, 25 mL, Intravenous, PRN  nicotine (NICODERM CQ) 21 MG/24HR 1 patch, 1 patch, Transdermal, Daily PRN  promethazine (PHENERGAN) tablet 12.5 mg, 12.5 mg, Oral, Q6H PRN **OR** ondansetron (ZOFRAN) injection 4 mg, 4 mg, Intravenous, Q6H PRN  polyethylene glycol (GLYCOLAX) packet 17 g, 17 g, Oral, Daily PRN  acetaminophen (TYLENOL) tablet 650 mg, 650 mg, Oral, Q6H PRN **OR** acetaminophen (TYLENOL) suppository 650 mg, 650 mg, Rectal, Q6H PRN  enoxaparin (LOVENOX) injection 40 mg, 40 mg, Subcutaneous, Daily  [Held by provider] lisinopril (PRINIVIL;ZESTRIL) tablet 5 mg, 5 mg, Oral, Daily  [START ON 2021] carvedilol (COREG) tablet 3.125 mg, 3.125 mg, Oral, BID WC  furosemide (LASIX) injection 40 mg, 40 mg, Intravenous, BID  [Held by provider] digoxin (LANOXIN) injection 125 mcg, 125 mcg, Intravenous, Daily  famotidine (PEPCID) tablet 20 mg, 20 mg, Oral, Daily  insulin lispro (HUMALOG) injection vial 0-12 Units, 0-12 Units, Subcutaneous, TID WC  insulin lispro (HUMALOG) injection vial 0-6 Units, 0-6 Units, Subcutaneous, Nightly  [] amiodarone (CORDARONE) 450 mg in dextrose 5 % 250 mL infusion, 1 mg/min, Intravenous, Continuous **FOLLOWED BY** amiodarone (CORDARONE) 450 mg in dextrose 5 % 250 mL infusion, 0.5 mg/min, Intravenous, Continuous  sodium polystyrene (KAYEXALATE) 15 GM/60ML suspension 15 g, 15 g, Oral, Once  insulin regular (HUMULIN R;NOVOLIN R) injection 10 Units, 10 Units, Intravenous, Once **AND** dextrose 50 % IV solution, 25 g, Intravenous, Once **AND** POCT Glucose, , , Q30 Min **AND** POCT Glucose, , , Q1H **AND** POCT Glucose, , , 4x Daily AC & HS  glucose (GLUTOSE) 40 % oral gel 15 g, 15 g, Oral, PRN  dextrose 50 % IV solution, 12.5 g, Intravenous, PRN  glucagon (rDNA) injection 1 mg, 1 mg, Intramuscular, PRN  dextrose 5 % solution, 100 mL/hr, Intravenous, PRN  insulin regular (HUMULIN R;NOVOLIN R) injection 10 Units, 10 Units, Intravenous, Once **AND** dextrose 50 % IV solution, 25 g, Intravenous, Once **AND** POCT Glucose, , , Q30 Min **AND** POCT Glucose, , , Q1H **AND** POCT Glucose, , , 4x Daily AC & HS  glucose (GLUTOSE) 40 % oral gel 15 g, 15 g, Oral, PRN  dextrose 50 % IV solution, 12.5 g, Intravenous, PRN  glucagon (rDNA) injection 1 mg, 1 mg, Intramuscular, PRN  dextrose 5 % solution, 100 mL/hr, Intravenous, PRN    Allergies:  Lisinopril    Social History:        Family History: family history is not on file. No h/o sudden cardiac death. No for premature CAD    REVIEW OF SYSTEMS:    · Constitutional: there has been no unanticipated weight loss. There's been No change in energy level, No change in activity level. · Eyes: No visual changes or diplopia. No scleral icterus. · ENT: No Headaches  · Cardiovascular: shortness of breath   · Respiratory: No previous pulmonary problems, No cough  · Gastrointestinal: No abdominal pain. No change in bowel or bladder habits. · Genitourinary: No dysuria, trouble voiding, or hematuria. · Musculoskeletal:  No gait disturbance, No weakness or joint complaints. · Integumentary: No rash or pruritis. · Neurological: No headache, diplopia, change in muscle strength, numbness or tingling. No change in gait, balance, coordination, mood, affect, memory, mentation, behavior. · Psychiatric: No anxiety, or depression. · Endocrine: No temperature intolerance. No excessive thirst, fluid intake, or urination. No tremor.   · Hematologic/Lymphatic: No abnormal bruising or bleeding, blood clots or swollen lymph nodes. · Allergic/Immunologic: No nasal congestion or hives. PHYSICAL EXAM:      /74   Pulse 84   Temp 98.1 °F (36.7 °C) (Oral)   Resp 28   Ht 5' 3\" (1.6 m)   Wt 108 lb (49 kg)   SpO2 98%   BMI 19.13 kg/m²    Constitutional and General Appearance: alert, cooperative, no distress and appears stated age  HEENT: PERRL, no cervical lymphadenopathy. No masses palpable. Normal oral mucosa  Respiratory:  · Normal excursion and expansion without use of accessory muscles  · Resp Auscultation: Good respiratory effort. No for increased work of breathing. On auscultation: clear to auscultation bilaterally  Cardiovascular:  · The apical impulse is not displaced  · Heart tones are crisp and normal. regular S1 and S2.  · ++ JVD  Abdomen:   · No masses or tenderness  · Bowel sounds present  Extremities:  ·  No Cyanosis or Clubbing  ·  Lower extremity edema: No  ·  Skin: Warm and dry  Neurological:  · Alert and oriented. · Moves all extremities well  · No abnormalities of mood, affect, memory, mentation, or behavior are noted    DATA:    Diagnostics:      EKG:   Afib with RVR    ECHO:   6/01/2021   Left Ventricle : The left ventricle is normal size. Left ventricular      systolic function is moderately reduced. Regional wall motion      abnormalities are noted. There is  hypokinesis in the septal and      inferior walls. LVEF is estimated 35-40%.      Right Ventricle : Right ventricular systolic function is mildly      reduced.      Atria : Left atrium is mildly dilated. Right atrium is mildly dilated.      Aortic Valve : Aortic valve is calcified. No significant valvular      aortic stenosis.      Mitral Valve : Mitral valve leaflets are calcified. Moderate mitral      annular calcification. Mild mitral regurgitation.      Tricuspid Valve : The tricuspid valve is normal in structure. There      is trace tricuspid regurgitation.  The right atrial pressure is      estimated at 3 mmHg. Right ventricular systolic pressure is estimated      at 48 mmHg.  There is mild pulmonary hypertension. Stress Test:     Stress 7/2020  - possible fixed inferior defect, no reversible defect    CT chest 5/2021  Moderate pleural effusion  Emphysema  No PE        Labs:     CBC:   Recent Labs     06/01/21  0528 06/03/21  0708   WBC 8.3 11.7*   HGB 11.6* 13.7   HCT 35.6 44.8    205     BMP:   Recent Labs     06/01/21  0528 06/03/21  0708    133*   K 3.1* 6.1*   CO2 25 16*   BUN 7 14   CREATININE 0.76 1.12*   LABGLOM > 60 47*   GLUCOSE 139* 182*     BNP: No results for input(s): BNP in the last 72 hours. PT/INR: No results for input(s): PROTIME, INR in the last 72 hours. APTT:No results for input(s): APTT in the last 72 hours. CARDIAC ENZYMES:  Recent Labs     06/02/21  2235   TROPONINI 0.016     FASTING LIPID PANEL:  Lab Results   Component Value Date    HDL 50 06/03/2021    TRIG 63 06/03/2021     LIVER PROFILE:No results for input(s): AST, ALT, LABALBU in the last 72 hours. Patient Active Problem List   Diagnosis    Congestive heart failure of unknown etiology ([facility] [address]   IMPRESSION:    1. H/O  Afib with RVR   2. Acute systolic CHF  3. CAD h/o stents - [address] many years ago  4. Cardiomyopathy EF 35-40% decreased from 45% last year    RECOMMENDATIONS:  1. Recommend to continue amiodarone  2. Switch lovenox to heparin due to ROB  3. Evidence of volume overload on exam - continue IV lasix 40 mg BID  4. Repeat CXR  5. Hold digoxin due to ROB  6. Initial plan for cath today, will hold off due to ROB and hyper K  7. Insulin and dextrose for hyper K after repeat BMP, recheck K in after noon   8. Will consider MAHI/CV and Cath once ROB is resolved     Will discuss with rounding attending Dr. Ford Carvalho for final recommendations.     Luis Alberto Hope MD  Cardiology Fellow

## 2021-06-03 NOTE — PROGRESS NOTES
Physical Therapy    DATE: 6/3/2021    NAME: Keila Houston  MRN: [id number]   : 1940      Patient not seen this date for Physical Therapy due to: Other: Pt not appropriate this date for PT d/t increased anxiety, SOB. Await POC. PT to check back .       Electronically signed by FE Candelario on 6/3/2021 at 9:15 AM

## 2021-06-03 NOTE — PLAN OF CARE
Nutrition Problem #1: Severe malnutrition, In context of chronic illness  Intervention: Food and/or Nutrient Delivery: Continue NPO (Start diet as able; suggest nepro supplements QD per pt request)  Nutritional Goals: Start diet within 24-72 hrs

## 2021-06-03 NOTE — PROGRESS NOTES
Congestive Heart Failure Education completed and charted. CHF booklet given. Patient was receptive to education. Discussed the  importance of medication compliance. Discussed the importance of a heart healthy diet. Discussed 2000 mg sodium-restricted daily diet. Patient instructed to limit fluid intake to  1.5 to 2 liters per day. Patient instructed to weigh self at the same time of each day each morning, reinforced teaching to monitor for 3-5 lb weight increase over 1-2 days notify physician if change noted. Signs and symptoms of CHF discussed with patient, such as feeling more tired than normal, feeling short of breath, coughing that increases when lying down, sudden weight gain, swelling of the feet, legs or belly. Patient verbalized understanding to notify physician office if these symptoms occur. EF 35-40%  Pt and family educated together. Pt lives in [facility].    DNR CC.

## 2021-06-03 NOTE — PLAN OF CARE
Problem: Falls - Risk of:  Goal: Will remain free from falls  Description: Will remain free from falls  Outcome: Ongoing  Goal: Absence of physical injury  Description: Absence of physical injury  Outcome: Ongoing     Problem:  Activity:  Goal: Ability to tolerate increased activity will improve  Description: Ability to tolerate increased activity will improve  Outcome: Ongoing     Problem: OXYGENATION/RESPIRATORY FUNCTION  Goal: Patient will maintain patent airway  Outcome: Ongoing  Goal: Patient will achieve/maintain normal respiratory rate/effort  Description: Respiratory rate and effort will be within normal limits for the patient  Outcome: Ongoing     Problem: HEMODYNAMIC STATUS  Goal: Patient has stable vital signs and fluid balance  Outcome: Ongoing     Problem: FLUID AND ELECTROLYTE IMBALANCE  Goal: Fluid and electrolyte balance are achieved/maintained  Outcome: Ongoing     Problem: ACTIVITY INTOLERANCE/IMPAIRED MOBILITY  Goal: Mobility/activity is maintained at optimum level for patient  Outcome: Ongoing   Electronically signed by Erik Vazquez RN on 6/3/2021 at 6:43 AM

## 2021-06-03 NOTE — PROGRESS NOTES
Occupational Therapy Not Seen Note    DATE: 6/3/2021  Name: Pippa Willis  : 1940  MRN: [id number]    Patient not available for Occupational Therapy due to:    Patient is not appropriate for OOB activity at this time d/t SOB and increased anxiety, and awaiting POC     Next Scheduled Treatment: Ck     Electronically signed by Toni Gutierrez S/OT on 6/3/2021 at 9:16 AM

## 2021-06-04 LAB
ACTIVATED CLOTTING TIME: 217 SEC (ref 79–149)
ALBUMIN (CALCULATED): 3.9 G/DL (ref 3.2–5.2)
ALBUMIN PERCENT: 67 % (ref 45–65)
ALPHA 1 PERCENT: 3 % (ref 3–6)
ALPHA 2 PERCENT: 11 % (ref 6–13)
ALPHA-1-GLOBULIN: 0.2 G/DL (ref 0.1–0.4)
ALPHA-2-GLOBULIN: 0.6 G/DL (ref 0.5–0.9)
ANION GAP SERPL CALCULATED.3IONS-SCNC: 11 MMOL/L (ref 9–17)
BETA GLOBULIN: 0.5 G/DL (ref 0.5–1.1)
BETA PERCENT: 9 % (ref 11–19)
BUN BLDV-MCNC: 17 MG/DL (ref 8–23)
BUN/CREAT BLD: ABNORMAL (ref 9–20)
CALCIUM SERPL-MCNC: 7.1 MG/DL (ref 8.6–10.4)
CHLORIDE BLD-SCNC: 100 MMOL/L (ref 98–107)
CO2: 27 MMOL/L (ref 20–31)
CREAT SERPL-MCNC: 0.76 MG/DL (ref 0.5–0.9)
GAMMA GLOBULIN %: 10 % (ref 9–20)
GAMMA GLOBULIN: 0.6 G/DL (ref 0.5–1.5)
GFR AFRICAN AMERICAN: >60 ML/MIN
GFR NON-AFRICAN AMERICAN: >60 ML/MIN
GFR SERPL CREATININE-BSD FRML MDRD: ABNORMAL ML/MIN/{1.73_M2}
GFR SERPL CREATININE-BSD FRML MDRD: ABNORMAL ML/MIN/{1.73_M2}
GLUCOSE BLD-MCNC: 75 MG/DL (ref 65–105)
GLUCOSE BLD-MCNC: 76 MG/DL (ref 70–99)
GLUCOSE BLD-MCNC: 87 MG/DL (ref 65–105)
GLUCOSE BLD-MCNC: 89 MG/DL (ref 65–105)
LV EF: 40 %
LVEF MODALITY: NORMAL
MAGNESIUM: 1.5 MG/DL (ref 1.6–2.6)
PARTIAL THROMBOPLASTIN TIME: 39.2 SEC (ref 20.5–30.5)
PARTIAL THROMBOPLASTIN TIME: 42.4 SEC (ref 20.5–30.5)
PARTIAL THROMBOPLASTIN TIME: 52.5 SEC (ref 20.5–30.5)
PATHOLOGIST: ABNORMAL
PHOSPHORUS: 2 MG/DL (ref 2.6–4.5)
PHOSPHORUS: 5 MG/DL (ref 2.6–4.5)
POTASSIUM SERPL-SCNC: 2.8 MMOL/L (ref 3.7–5.3)
POTASSIUM SERPL-SCNC: 4.9 MMOL/L (ref 3.7–5.3)
PROTEIN ELECTROPHORESIS, SERUM: ABNORMAL
SARS-COV-2, RAPID: NOT DETECTED
SODIUM BLD-SCNC: 138 MMOL/L (ref 135–144)
SPECIMEN DESCRIPTION: NORMAL
TOTAL PROT. SUM,%: 100 % (ref 98–102)
TOTAL PROT. SUM: 5.8 G/DL (ref 6.3–8.2)
TOTAL PROTEIN: 5.8 G/DL (ref 6.4–8.3)

## 2021-06-04 PROCEDURE — 2500000003 HC RX 250 WO HCPCS: Performed by: INTERNAL MEDICINE

## 2021-06-04 PROCEDURE — 93325 DOPPLER ECHO COLOR FLOW MAPG: CPT

## 2021-06-04 PROCEDURE — C1725 CATH, TRANSLUMIN NON-LASER: HCPCS

## 2021-06-04 PROCEDURE — 85730 THROMBOPLASTIN TIME PARTIAL: CPT

## 2021-06-04 PROCEDURE — 6360000004 HC RX CONTRAST MEDICATION

## 2021-06-04 PROCEDURE — 6370000000 HC RX 637 (ALT 250 FOR IP): Performed by: NURSE PRACTITIONER

## 2021-06-04 PROCEDURE — 2709999900 HC NON-CHARGEABLE SUPPLY

## 2021-06-04 PROCEDURE — 6370000000 HC RX 637 (ALT 250 FOR IP): Performed by: INTERNAL MEDICINE

## 2021-06-04 PROCEDURE — 027034Z DILATION OF CORONARY ARTERY, ONE ARTERY WITH DRUG-ELUTING INTRALUMINAL DEVICE, PERCUTANEOUS APPROACH: ICD-10-PCS | Performed by: STUDENT IN AN ORGANIZED HEALTH CARE EDUCATION/TRAINING PROGRAM

## 2021-06-04 PROCEDURE — 83735 ASSAY OF MAGNESIUM: CPT

## 2021-06-04 PROCEDURE — 92960 CARDIOVERSION ELECTRIC EXT: CPT | Performed by: INTERNAL MEDICINE

## 2021-06-04 PROCEDURE — 93454 CORONARY ARTERY ANGIO S&I: CPT | Performed by: INTERNAL MEDICINE

## 2021-06-04 PROCEDURE — 6370000000 HC RX 637 (ALT 250 FOR IP)

## 2021-06-04 PROCEDURE — C1894 INTRO/SHEATH, NON-LASER: HCPCS

## 2021-06-04 PROCEDURE — 36415 COLL VENOUS BLD VENIPUNCTURE: CPT

## 2021-06-04 PROCEDURE — 80048 BASIC METABOLIC PNL TOTAL CA: CPT

## 2021-06-04 PROCEDURE — C9600 PERC DRUG-EL COR STENT SING: HCPCS | Performed by: INTERNAL MEDICINE

## 2021-06-04 PROCEDURE — C1769 GUIDE WIRE: HCPCS

## 2021-06-04 PROCEDURE — 93320 DOPPLER ECHO COMPLETE: CPT

## 2021-06-04 PROCEDURE — 97162 PT EVAL MOD COMPLEX 30 MIN: CPT

## 2021-06-04 PROCEDURE — 6360000002 HC RX W HCPCS

## 2021-06-04 PROCEDURE — 2580000003 HC RX 258: Performed by: INTERNAL MEDICINE

## 2021-06-04 PROCEDURE — 6370000000 HC RX 637 (ALT 250 FOR IP): Performed by: FAMILY MEDICINE

## 2021-06-04 PROCEDURE — 99232 SBSQ HOSP IP/OBS MODERATE 35: CPT | Performed by: INTERNAL MEDICINE

## 2021-06-04 PROCEDURE — 6370000000 HC RX 637 (ALT 250 FOR IP): Performed by: STUDENT IN AN ORGANIZED HEALTH CARE EDUCATION/TRAINING PROGRAM

## 2021-06-04 PROCEDURE — 82947 ASSAY GLUCOSE BLOOD QUANT: CPT

## 2021-06-04 PROCEDURE — C1887 CATHETER, GUIDING: HCPCS

## 2021-06-04 PROCEDURE — 84132 ASSAY OF SERUM POTASSIUM: CPT

## 2021-06-04 PROCEDURE — 85347 COAGULATION TIME ACTIVATED: CPT

## 2021-06-04 PROCEDURE — C1874 STENT, COATED/COV W/DEL SYS: HCPCS

## 2021-06-04 PROCEDURE — 84100 ASSAY OF PHOSPHORUS: CPT

## 2021-06-04 PROCEDURE — 93312 ECHO TRANSESOPHAGEAL: CPT

## 2021-06-04 PROCEDURE — 6360000002 HC RX W HCPCS: Performed by: NURSE PRACTITIONER

## 2021-06-04 PROCEDURE — 2580000003 HC RX 258: Performed by: FAMILY MEDICINE

## 2021-06-04 PROCEDURE — 6360000002 HC RX W HCPCS: Performed by: INTERNAL MEDICINE

## 2021-06-04 PROCEDURE — 97535 SELF CARE MNGMENT TRAINING: CPT

## 2021-06-04 PROCEDURE — B2111ZZ FLUOROSCOPY OF MULTIPLE CORONARY ARTERIES USING LOW OSMOLAR CONTRAST: ICD-10-PCS | Performed by: STUDENT IN AN ORGANIZED HEALTH CARE EDUCATION/TRAINING PROGRAM

## 2021-06-04 PROCEDURE — 2580000003 HC RX 258: Performed by: STUDENT IN AN ORGANIZED HEALTH CARE EDUCATION/TRAINING PROGRAM

## 2021-06-04 PROCEDURE — 87635 SARS-COV-2 COVID-19 AMP PRB: CPT

## 2021-06-04 PROCEDURE — 2060000000 HC ICU INTERMEDIATE R&B

## 2021-06-04 PROCEDURE — 97166 OT EVAL MOD COMPLEX 45 MIN: CPT

## 2021-06-04 PROCEDURE — 2500000003 HC RX 250 WO HCPCS

## 2021-06-04 PROCEDURE — 5A2204Z RESTORATION OF CARDIAC RHYTHM, SINGLE: ICD-10-PCS | Performed by: STUDENT IN AN ORGANIZED HEALTH CARE EDUCATION/TRAINING PROGRAM

## 2021-06-04 PROCEDURE — 97530 THERAPEUTIC ACTIVITIES: CPT

## 2021-06-04 PROCEDURE — 4A023N7 MEASUREMENT OF CARDIAC SAMPLING AND PRESSURE, LEFT HEART, PERCUTANEOUS APPROACH: ICD-10-PCS | Performed by: STUDENT IN AN ORGANIZED HEALTH CARE EDUCATION/TRAINING PROGRAM

## 2021-06-04 RX ORDER — ASPIRIN 81 MG/1
81 TABLET ORAL DAILY
Status: DISCONTINUED | OUTPATIENT
Start: 2021-06-04 | End: 2021-06-06 | Stop reason: HOSPADM

## 2021-06-04 RX ORDER — POTASSIUM CHLORIDE 7.45 MG/ML
10 INJECTION INTRAVENOUS
Status: DISPENSED | OUTPATIENT
Start: 2021-06-04 | End: 2021-06-04

## 2021-06-04 RX ORDER — MAGNESIUM SULFATE IN WATER 40 MG/ML
2000 INJECTION, SOLUTION INTRAVENOUS ONCE
Status: COMPLETED | OUTPATIENT
Start: 2021-06-04 | End: 2021-06-04

## 2021-06-04 RX ORDER — AMIODARONE HYDROCHLORIDE 200 MG/1
200 TABLET ORAL 2 TIMES DAILY
Status: DISCONTINUED | OUTPATIENT
Start: 2021-06-04 | End: 2021-06-05

## 2021-06-04 RX ORDER — CLOPIDOGREL BISULFATE 75 MG/1
75 TABLET ORAL DAILY
Status: DISCONTINUED | OUTPATIENT
Start: 2021-06-05 | End: 2021-06-06 | Stop reason: HOSPADM

## 2021-06-04 RX ORDER — POTASSIUM CHLORIDE 20 MEQ/1
40 TABLET, EXTENDED RELEASE ORAL ONCE
Status: DISCONTINUED | OUTPATIENT
Start: 2021-06-04 | End: 2021-06-04

## 2021-06-04 RX ORDER — POTASSIUM CHLORIDE 29.8 MG/ML
20 INJECTION INTRAVENOUS ONCE
Status: DISCONTINUED | OUTPATIENT
Start: 2021-06-04 | End: 2021-06-04

## 2021-06-04 RX ADMIN — FLUDROCORTISONE ACETATE 0.1 MG: 0.1 TABLET ORAL at 09:15

## 2021-06-04 RX ADMIN — SODIUM CHLORIDE, PRESERVATIVE FREE 10 ML: 5 INJECTION INTRAVENOUS at 09:15

## 2021-06-04 RX ADMIN — FAMOTIDINE 20 MG: 20 TABLET, FILM COATED ORAL at 09:15

## 2021-06-04 RX ADMIN — POTASSIUM PHOSPHATE, MONOBASIC AND POTASSIUM PHOSPHATE, DIBASIC 30 MMOL: 224; 236 INJECTION, SOLUTION, CONCENTRATE INTRAVENOUS at 11:16

## 2021-06-04 RX ADMIN — ASPIRIN 325 MG: 325 TABLET ORAL at 09:15

## 2021-06-04 RX ADMIN — MAGNESIUM SULFATE 2000 MG: 2 INJECTION INTRAVENOUS at 10:23

## 2021-06-04 RX ADMIN — PRAVASTATIN SODIUM 10 MG: 20 TABLET ORAL at 09:15

## 2021-06-04 RX ADMIN — CARVEDILOL 3.12 MG: 3.12 TABLET, FILM COATED ORAL at 22:40

## 2021-06-04 RX ADMIN — HEPARIN SODIUM 1470 UNITS: 1000 INJECTION INTRAVENOUS; SUBCUTANEOUS at 00:55

## 2021-06-04 RX ADMIN — PANTOPRAZOLE SODIUM 40 MG: 40 TABLET, DELAYED RELEASE ORAL at 09:15

## 2021-06-04 RX ADMIN — Medication 81 MG: at 22:43

## 2021-06-04 RX ADMIN — CARVEDILOL 3.12 MG: 3.12 TABLET, FILM COATED ORAL at 09:15

## 2021-06-04 RX ADMIN — POTASSIUM CHLORIDE 10 MEQ: 7.46 INJECTION, SOLUTION INTRAVENOUS at 12:36

## 2021-06-04 RX ADMIN — SODIUM CHLORIDE, PRESERVATIVE FREE 10 ML: 5 INJECTION INTRAVENOUS at 21:00

## 2021-06-04 RX ADMIN — AMIODARONE HYDROCHLORIDE 200 MG: 200 TABLET ORAL at 22:42

## 2021-06-04 RX ADMIN — SODIUM CHLORIDE: 9 INJECTION, SOLUTION INTRAVENOUS at 08:10

## 2021-06-04 RX ADMIN — POTASSIUM BICARBONATE 40 MEQ: 782 TABLET, EFFERVESCENT ORAL at 11:56

## 2021-06-04 ASSESSMENT — PAIN SCALES - GENERAL
PAINLEVEL_OUTOF10: 0

## 2021-06-04 NOTE — PROGRESS NOTES
Nephrology Progress Note      SUBJECTIVE       Pt was seen and examined. No acute issues overnite. Stable hemodynamics . Patient is awake and alert. Overall feels a little better. Her serum creatinine is down to her baseline of 0.72 today. Cardiology plans for upcoming cardiac cath for this afternoon noted. Her potassium is being replaced because her K was down to 2.8 today. Magnesium was a little low, she is receiving replacement for that as well. Serum free light chain ratio is normal  Renal ultrasound is without any hydronephrosis, mild renal cortical thinning noted. OBJECTIVE      CURRENT TEMPERATURE:  Temp: 97.5 °F (36.4 °C)  MAXIMUM TEMPERATURE OVER 24HRS:  Temp (24hrs), Av.1 °F (36.7 °C), Min:97.5 °F (36.4 °C), Max:98.3 °F (36.8 °C)    CURRENT RESPIRATORY RATE:  Resp: 22  CURRENT PULSE:  Pulse: 88  CURRENT BLOOD PRESSURE:  BP: (!) 131/41  24HR BLOOD PRESSURE RANGE:  Systolic (72BZB), QUO:050 , Min:121 , FDI:744   ; Diastolic (78OKG), CGO:04, Min:41, Max:77    24HR INTAKE/OUTPUT:      Intake/Output Summary (Last 24 hours) at 2021 1150  Last data filed at 2021 8541  Gross per 24 hour   Intake 1848 ml   Output 1275 ml   Net 573 ml     WEIGHT :  Patient Vitals for the past 96 hrs (Last 3 readings):   Weight   21 0200 108 lb (49 kg)     PHYSICAL EXAM      GENERAL APPEARANCE:Awake, alert, in no acute distress  SKIN: warm and dry, no rash or erythema  EYES: conjunctivae normal and sclera anicteric  ENT: no thrush no pharyngeal congestion   NECK:   No JVD. No carotid bruits and no carotid lymphadenopathy . PULMONARY: Mild wheezing noted . CADRDIOVASCULAR: S1 and S2 normal NO S3 and NO S4 . No rubs , no murmur. ABDOMEN: soft nontender, bowel sounds present, no organomegaly, no ascites.        EXTREMITIES: no cyanosis, clubbing or edema     CURRENT MEDICATIONS      potassium phosphate 30 mmol in dextrose 5 % 250 mL IVPB, Once  magnesium sulfate 2000 mg in 50 mL IVPB premix, Once potassium bicarb-citric acid (EFFER-K) effervescent tablet 40 mEq, Once  potassium chloride 10 mEq/100 mL IVPB (Peripheral Line), Q1H  sodium chloride flush 0.9 % injection 5-40 mL, 2 times per day  sodium chloride flush 0.9 % injection 10 mL, PRN  0.9 % sodium chloride infusion, PRN  nicotine (NICODERM CQ) 21 MG/24HR 1 patch, Daily PRN  promethazine (PHENERGAN) tablet 12.5 mg, Q6H PRN   Or  ondansetron (ZOFRAN) injection 4 mg, Q6H PRN  polyethylene glycol (GLYCOLAX) packet 17 g, Daily PRN  acetaminophen (TYLENOL) tablet 650 mg, Q6H PRN   Or  acetaminophen (TYLENOL) suppository 650 mg, Q6H PRN  carvedilol (COREG) tablet 3.125 mg, BID WC  [Held by provider] digoxin (LANOXIN) injection 125 mcg, Daily  famotidine (PEPCID) tablet 20 mg, Daily  insulin lispro (HUMALOG) injection vial 0-12 Units, TID WC  insulin lispro (HUMALOG) injection vial 0-6 Units, Nightly  insulin regular (HUMULIN R;NOVOLIN R) injection 10 Units, Once  glucose (GLUTOSE) 40 % oral gel 15 g, PRN  dextrose 50 % IV solution, PRN  glucagon (rDNA) injection 1 mg, PRN  dextrose 5 % solution, PRN  dextrose 50 % IV solution, Once  glucose (GLUTOSE) 40 % oral gel 15 g, PRN  dextrose 50 % IV solution, PRN  glucagon (rDNA) injection 1 mg, PRN  dextrose 5 % solution, PRN  heparin (porcine) injection 2,940 Units, PRN  heparin (porcine) injection 1,470 Units, PRN  heparin 25,000 units in dextrose 5% 250 mL (premix) infusion, Continuous  0.9 % sodium chloride infusion, Continuous  aspirin tablet 325 mg, Daily  fludrocortisone (FLORINEF) tablet 0.1 mg, Daily  meclizine (ANTIVERT) tablet 25 mg, Nightly  pantoprazole (PROTONIX) tablet 40 mg, QAM AC  pravastatin (PRAVACHOL) tablet 10 mg, Daily          LABS      CBC:   Recent Labs     06/03/21  0708   WBC 11.7*   RBC 4.33   HGB 13.7   HCT 44.8   .5*   MCH 31.6   MCHC 30.6   RDW 14.1      MPV 9.2      BMP:   Recent Labs     06/03/21  1038 06/03/21  1335 06/04/21  0821   * 136 138   K 6.3* 4.8 2.8* cath today. PLAN      1. Okay for cardiac cath after potassium replacement and magnesium replacement. 2.  Nothing additionally needs to be done from renal standpoint, I will sign off.  3.  Call us back if any questions arise. 4.  As such no outpatient follow-up      Please do not hesitate to call with questions.     This note is created with the assistance of a speech-recognition program. While intending to generate a document that actually reflects the content of the visit, no guarantees can be provided that every mistake has been identified and corrected by editing    Electronically signed by Grace Molina MD on 6/4/2021 at 11:50 AM

## 2021-06-04 NOTE — OP NOTE
[facility]  Cardiac catheterization note        Date:   6/4/2021  Patient name: Roderick Vallejo  Date of admission:  6/3/2021  1:23 AM  MRN:   [id number]  YOB: 1940    Operators:  Stas Kasper MD    Pre Procedure Conscious Sedation Data:    ASA Class:    [] I [x] II [] III [] IV    Mallampati Class:  [] I [x] II [] III [] IV      Indication:      - Atrial fibrillation w/cardioversion       - Cardiomyopathy       - CHF       Procedure:      - C  - Selective left and right coronary angiogram   - PTCA/NEVILLE Mid LCX       Access:  [] Femoral  [x] Radial  artery       [x] Right  [] Left    Procedure: After informed consent was obtained with explanation of the risks and benefits, patient was brought to the cath lab. The access area was prepped and draped in sterile fashion. 1% lidocaine was used for local block. The artery was cannulated with 6  Fr sheath with brisk arterial blood return. The side port was frequently flushed and aspirated with normal saline. Findings:    LMCA: Mild irregularities 10-20%. LAD: Patent stent in mid segment with 10-20% instent restenosis     LCx: Large vessel with mid 80% stenosis , reduced to 0% with PTCA/NEV[place]LE. OM1 is small to intermediate caliber, patent with ostial 40% stenosis    RCA: Mid 100% occlusion, right to right and left to right Collaterals. Conclusions:    1. Patent stent in LAD    2. Successful PCI / Drug Eluting Stent of the mid Circumflex Coronary    Artery.    3.  mid RCA with right to right and left to right collaterals        Recommendation:   Routine Post Stent Orders.    Medical therapy with asa, plavix and Eliquis.  Will d/c ASA on discharge.    Optimize medical therapy for CMP    Repeat echo in 3 months to reassess LVEF      EBL less than 10 ml     Electronically signed by Matthew Alcantara MD on 6/4/2021 at 7:05 PM  Cardiovascular fellow  [facility]      Attending Physician    Lynn Umanzor MD, Neville Griffith

## 2021-06-04 NOTE — PROGRESS NOTES
Patient reported that she had not been feeling good for about a week and had decreased activity, fatigue. She also reported chest pain on mild exertion. Patient denied any fever, cough, expectoration, nausea, vomiting. She has known history of CAD with multiple stents, chronic systolic heart failure with EF 25 to 30%, hypertension, hyperlipidemia. She is  former smoker and quit more than 30 years before. Patient was found to be in A. fib with RVR and was treated with amiodarone infusion. She also required digoxin IV for heart rate control. Patient was eval by cardiology and recommended transfer to [facility] for further treatment and possible heart catheterization. Review of Systems:     Constitutional:  negative for chills, fevers, sweats  Respiratory:  negative for cough, dyspnea on exertion, shortness of breath, wheezing  Cardiovascular:  negative for chest pain, chest pressure/discomfort, lower extremity edema, palpitations  Gastrointestinal:  negative for abdominal pain, constipation, diarrhea, nausea, vomiting  Neurological:  negative for dizziness, headache    Medications: Allergies:     Allergies   Allergen Reactions    Fosamax [Alendronate] Other (See Comments)    Lisinopril Other (See Comments)     cough    Pioglitazone Other (See Comments)       Current Meds:   Scheduled Meds:    potassium phosphate IVPB  30 mmol Intravenous Once    potassium chloride  10 mEq Intravenous Q1H    sodium chloride flush  5-40 mL Intravenous 2 times per day    carvedilol  3.125 mg Oral BID WC    [Held by provider] digoxin  125 mcg Intravenous Daily    famotidine  20 mg Oral Daily    insulin lispro  0-12 Units Subcutaneous TID WC    insulin lispro  0-6 Units Subcutaneous Nightly    insulin regular  10 Units Intravenous Once    dextrose  25 g Intravenous Once    aspirin  325 mg Oral Daily    fludrocortisone  0.1 mg Oral Daily    meclizine  25 mg Oral Nightly    pantoprazole  40 mg Oral QAM AC  pravastatin  10 mg Oral Daily     Continuous Infusions:    sodium chloride      dextrose      dextrose      heparin (PORCINE) Infusion 16 Units/kg/hr (21 0053)    sodium chloride 50 mL/hr at 21 0810     PRN Meds: sodium chloride flush, sodium chloride, nicotine, promethazine **OR** ondansetron, polyethylene glycol, acetaminophen **OR** acetaminophen, glucose, dextrose, glucagon (rDNA), dextrose, glucose, dextrose, glucagon (rDNA), dextrose, heparin (porcine), heparin (porcine)    Data:     Past Medical History:   has a past medical history of Adhesive capsulitis left shoulder, Atrial fibrillation (HCC), CAD (coronary artery disease), CHF (congestive heart failure) ([facility] [address] Fracture of proximal end of left humerus, Hyperlipidemia, Hypertension, Hypothyroidism, and Type 2 diabetes mellitus ([facility] [address] Social History:   reports that she has quit smoking. She has a 20.00 pack-year smoking history. She has never used smokeless tobacco.     Family History:   Family History   Problem Relation Age of Onset    Heart Disease Father        Vitals:  BP (!) 131/41   Pulse 88   Temp 97.5 °F (36.4 °C) (Oral)   Resp 22   Ht 5' 3\" (1.6 m)   Wt 108 lb (49 kg)   SpO2 92%   BMI 19.13 kg/m²   Temp (24hrs), Av.1 °F (36.7 °C), Min:97.5 °F (36.4 °C), Max:98.3 °F (36.8 °C)    Recent Labs     21  1320 21  1947 21  0709 21  1142   POCGLU 145* 121* 75 89       I/O (24Hr):     Intake/Output Summary (Last 24 hours) at 2021 1353  Last data filed at 2021 0512  Gross per 24 hour   Intake 1848 ml   Output 1275 ml   Net 573 ml       Labs:  Hematology:  Recent Labs     21  0708   WBC 11.7*   RBC 4.33   HGB 13.7   HCT 44.8   .5*   MCH 31.6   MCHC 30.6   RDW 14.1      MPV 9.2     Chemistry:  Recent Labs     21  0708 21  1038 21  1335 21  0821   * 132* 136 138   K 6.1* 6.3* 4.8 2.8*   CL 97* 96* 97* 100   CO2 16* 17* 21 27   GLUCOSE 182* 145* 129* 76   BUN 14 15 16 17   CREATININE 1.12* 1.02* 1.19* 0.76   MG 1.7  --   --  1.5*   ANIONGAP 20* 19* 18* 11   LABGLOM 47* 52* 44* >60   GFRAA 57* >60 53* >60   CALCIUM 8.0* 7.9* 7.9* 7.1*   PHOS  --   --   --  2.0*   PROBNP 3,847*  --   --   --    TROPHS  --  36*  --   --      Recent Labs     06/03/21  0708 06/03/21  1140 06/03/21  1320 06/03/21  1626 06/03/21  1947 06/04/21  0709 06/04/21  1142   PROT  --   --   --  5.8*  --   --   --    CHOL 106  --   --   --   --   --   --    HDL 50  --   --   --   --   --   --    LDLCHOLESTEROL 43  --   --   --   --   --   --    CHOLHDLRATIO 2.1  --   --   --   --   --   --    TRIG 63  --   --   --   --   --   --    VLDL NOT REPORTED  --   --   --   --   --   --    POCGLU  --  122* 145*  --  121* 75 89     ABG:No results found for: POCPH, PHART, PH, POCPCO2, IYO6YKJ, PCO2, POCPO2, PO2ART, PO2, POCHCO3, WFU5WML, HCO3, NBEA, PBEA, BEART, BE, THGBART, THB, CDC5QMZ, EFVY6BQW, J7MPXDIX, O2SAT, FIO2  No results found for: SPECIAL  No results found for: CULTURE    Radiology:  US RENAL COMPLETE    Result Date: 6/3/2021  No acute abnormality identified. Mild renal cortical thinning bilaterally.      XR CHEST PORTABLE    Result Date: 6/3/2021  Cardiomegaly with bilateral pleural effusions       Physical Examination:        General appearance:  alert, cooperative and no distress  Mental Status:  oriented to person, place and time and normal affect  Lungs:  clear to auscultation bilaterally, normal effort  Heart:  regular rate and rhythm, no murmur  Abdomen:  soft, nontender, nondistended, normal bowel sounds, no masses, hepatomegaly, splenomegaly  Extremities:  no edema, redness, tenderness in the calves  Skin:  no gross lesions, rashes, induration    Assessment:        Hospital Problems         Last Modified POA    * (Principal) Atrial fibrillation with rapid ventricular response ([address] 6/3/2021 Yes    Acute on chronic combined systolic (congestive) and diastolic (congestive) heart failure ([facility] [address] 6/3/2021 Yes    CAD (coronary artery disease) 6/3/2021 Yes    Hypertension 6/3/2021 Yes    Hyperlipidemia 6/3/2021 Yes    Hypothyroidism 6/3/2021 Yes    Type 2 diabetes mellitus ([facility] [address] 6/3/2021 Yes    Unstable angina ([facility] [address] 6/3/2021 Yes    Severe malnutrition ([facility] [address] 6/3/2021 Yes    ROB (acute kidney injury) ([facility] [address] 6/3/2021 Yes    Hyperkalemia 6/3/2021 Yes    Hypokalemia 6/4/2021 Yes          Plan:        1. Acute decompensated systolic heart failure secondary to A. fib RVR -does not appear to be in acute heart failure, recheck after cardiac catheterization. Patient normally not oxygen requiring but now requiring some supplemental oxygen. .  2. Unstable angina -chest pain-free currently, continue heparin drip and amiodarone drip. Cardiac catheterization MAHI today  3. Atrial fibrillation with rapid ventricular response -continue amiodarone, cardiology following,  4. CAD with multiple stents -continue Coreg, Lipitor, aspirin. 5. Hyperlipidemia -statins  6. Hypothyroidism-check TSH. 7. Diet-controlled diabetes mellitus-monitor blood sugar. Humalog as needed. Check A1c.  8. Chronic hypertension on Florinef -   9. Acute hyperkalemia -hypokalemia this morning. Replaced, nephrology following  10.  Discharge plan: Further recommendations after cardiac catheterization MAHI today    Prasanna Olmstead DO  6/4/2021  1:53 PM

## 2021-06-04 NOTE — PROGRESS NOTES
Physical Therapy    Facility/Department: [facility] CAR 2  Initial Assessment    NAME: Dylon Hurt  : 1940  MRN: [id number]    Date of Service: 2021    Discharge Recommendations:    Further therapy recommended at discharge. PT Equipment Recommendations  Other: CTA, no AD used, pt declined RW use    Assessment   Body structures, Functions, Activity limitations: Decreased strength;Decreased balance;Decreased functional mobility ; Decreased endurance  Assessment: Pt grossly CGA with Em for safety d/t LOB standing. Pt amb 6' no AD CGA. Pt would benefit from continued acute PT to address deficits. Prognosis: Good  Decision Making: Medium Complexity  PT Education: Plan of Care;PT Role;General Safety  REQUIRES PT FOLLOW UP: Yes  Activity Tolerance  Activity Tolerance: Patient limited by fatigue;Patient limited by endurance       Patient Diagnosis(es): There were no encounter diagnoses. has a past medical history of Adhesive capsulitis left shoulder, Atrial fibrillation (HCC), CAD (coronary artery disease), CHF (congestive heart failure) ([facility] [address] Fracture of proximal end of left humerus, Hyperlipidemia, Hypertension, Hypothyroidism, and Type 2 diabetes mellitus ([facility] [address] has a past surgical history that includes Femur fracture surgery (Right, 2015); Cholecystectomy; partial hysterectomy (cervix not removed); and Femur fracture surgery (Left, 2015).     Restrictions  Restrictions/Precautions  Restrictions/Precautions: General Precautions, Fall Risk (up with assist)  Required Braces or Orthoses?: No  Position Activity Restriction  Other position/activity restrictions: up with assist  Vision/Hearing  Vision: Impaired  Vision Exceptions: Wears glasses at all times  Hearing: Exceptions to [facility]  Hearing Exceptions: Hard of hearing/hearing concerns (reports hole in L ear, from childhood)     Subjective  General  Chart Reviewed: Yes  Patient assessed for rehabilitation services?: Yes  Response To Previous Treatment: Not applicable  Family / Caregiver Present: No  Follows Commands: Within Functional Limits  General Comment  Comments: OT co-eval  Subjective  Subjective: RN and pt agreeable to PT.  Pt alert in bed upon arrival.  Pain Screening  Patient Currently in Pain: Denies  Vital Signs  Patient Currently in Pain: Denies  Pre Treatment Pain Screening  Intervention List: Patient able to continue with treatment    Orientation  Orientation  Overall Orientation Status: Within Functional Limits  Social/Functional History  Social/Functional History  Lives With: Spouse  Type of Home: House  Home Layout: Two level, Able to Live on Main level with bedroom/bathroom  Home Access: Stairs to enter with rails  Entrance Stairs - Number of Steps: 3  Entrance Stairs - Rails: Both  Bathroom Shower/Tub: Tub/Shower unit  Bathroom Toilet: Standard  Bathroom Equipment: Shower chair  Bathroom Accessibility: Accessible  Home Equipment: Standard walkerBeth (pt reports no DME usage at baseline)  ADL Assistance: [address] [place] [address] Avenue: Independent  Homemaking Responsibilities: Yes  Meal Prep Responsibility: Primary  Laundry Responsibility: Primary  Cleaning Responsibility: Primary  Ambulation Assistance: Independent  Transfer Assistance: Independent  Active : No (pt reports not driving for past few months due to dizziness)  Patient's  Info:  drives  Mode of Transportation: Car  Occupation: 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  Leisure & Hobbies: spending time with  grandchildren  Additional Comments: pt reports  is home 24hr and able to assist PRN  Cognition    WFL    Objective          AROM RLE (degrees)  RLE AROM: WFL  AROM LLE (degrees)  LLE AROM : WFL  Strength RLE  Strength RLE: WFL  Comment: 4  Strength LLE  Strength LLE: WFL  Comment: 4  Strength RUE  Comment: minimum antigravity, see OT  Strength LUE  Comment: minimum antigravity, see OT     Sensation  Overall Sensation Status: WFL (Pt denies any numbness or tingling)  Bed mobility  Supine to Sit: Stand by assistance  Sit to Supine:  (left in chair)  Scooting: Stand by assistance  Transfers  Sit to Stand: Contact guard assistance  Stand to sit: Contact guard assistance  Comment: 3x, both CGA, first upon standing maintain RUE holding bed rail for support, declined HHA. LOB requiring return to seated EOB, safetly.   Ambulation  Ambulation?: Yes  Ambulation 1  Surface: level tile  Device: No Device  Assistance: Contact guard assistance  Quality of Gait: slowed, small steps, no LOB, unsteady  Distance: 6'  Stairs/Curb  Stairs?: No     Balance  Posture: Good  Sitting - Static: Good  Sitting - Dynamic: Good  Standing - Static: Good;-  Standing - Dynamic: Fair;+  Comments: no AD used        Plan   Plan  Times per week: 5-6x/wk  Current Treatment Recommendations: Strengthening, Balance Training, Functional Mobility Training, Gait Training, Endurance Training, Transfer Training, Stair training, Home Exercise Program, Safety Education & Training, Patient/Caregiver Education & Training, Equipment Evaluation, Education, & procurement  Safety Devices  Type of devices: Call light within reach, Nurse notified, Gait belt, Patient at risk for falls, All fall risk precautions in place, Left in chair, Chair alarm in place  Restraints  Initially in place: No    AM-PAC Score  AM-PAC Inpatient Mobility Raw Score : 17 (06/04/21 [facility])  AM-PAC Inpatient T-Scale Score : 42.13 (06/04/21 [facility])  Mobility Inpatient CMS 0-100% Score: 50.57 (06/04/21 [facility])  Mobility Inpatient CMS G-Code Modifier : CK (06/04/21 [facility])          Goals  Short term goals  Time Frame for Short term goals: 14 visits  Short term goal 1: Pt will be Eladio bed mobility  Short term goal 2: Pt will be Eladio transfers  Short term goal 3: Pt will be Eladio amb 240' RW or least restrictive AD  Short term goal 4: Pt will navigate 4 steps Eladio, either or both rail use       Therapy Time   Individual

## 2021-06-04 NOTE — PROGRESS NOTES
[facility]   Progress Note                   Date:   6/4/2021  Patient name: Ajay Lopez  Date of admission:  6/3/2021  1:23 AM  MRN:   [id number]  YOB: 1940  PCP: Laura Pantoja    Reason for Admission: Congestive heart failure of unknown etiology ([facility] [address] [I50.9]    Subjective:       Clinical Changes / Abnormalities: Patient seen and examined at the bed side. No new acute events overnight. Patient is doing well, has no complaints. Denies chest pain. States that her breathing is improved since admission. Remains on supplemental O2 at 3L/NC. Uses home O2 only at night. Reviewed vitals, labs, tele, & previous testing. Afib on tele with rates 70-80's.       Medications:   Scheduled Meds:   potassium phosphate IVPB  30 mmol Intravenous Once    potassium chloride  40 mEq Oral Once    magnesium sulfate  2,000 mg Intravenous Once    sodium chloride flush  5-40 mL Intravenous 2 times per day    carvedilol  3.125 mg Oral BID WC    [Held by provider] digoxin  125 mcg Intravenous Daily    famotidine  20 mg Oral Daily    insulin lispro  0-12 Units Subcutaneous TID WC    insulin lispro  0-6 Units Subcutaneous Nightly    insulin regular  10 Units Intravenous Once    dextrose  25 g Intravenous Once    aspirin  325 mg Oral Daily    fludrocortisone  0.1 mg Oral Daily    meclizine  25 mg Oral Nightly    pantoprazole  40 mg Oral QAM AC    pravastatin  10 mg Oral Daily     Continuous Infusions:   sodium chloride      amiodarone Stopped (06/03/21 1213)    dextrose      dextrose      heparin (PORCINE) Infusion 16 Units/kg/hr (06/04/21 0053)    sodium chloride 50 mL/hr at 06/04/21 0810     CBC:   Recent Labs     06/03/21  0708   WBC 11.7*   HGB 13.7        BMP:    Recent Labs     06/03/21  1038 06/03/21  1335 06/04/21  0821   * 136 138   K 6.3* 4.8 2.8*   CL 96* 97* 100   CO2 17* 21 27   BUN 15 16 17   CREATININE 1.02* 1.19* 0.76   GLUCOSE 145* 129* 76     Hepatic: No results for input(s): AST, ALT, ALB, BILITOT, ALKPHOS in the last 72 hours. Troponin:   Recent Labs     06/03/21  1038   TROPHS 36*     BNP: No results for input(s): BNP in the last 72 hours. Lipids:   Recent Labs     06/03/21  0708   CHOL 106   HDL 50     INR: No results for input(s): INR in the last 72 hours. Objective:   Vitals: /72   Pulse 88   Temp 98.2 °F (36.8 °C) (Oral)   Resp 26   Ht 5' 3\" (1.6 m)   Wt 108 lb (49 kg)   SpO2 99%   BMI 19.13 kg/m²   General appearance: alert and cooperative with exam  HEENT: Head: Normocephalic, no lesions, without obvious abnormality. Neck: no JVD, trachea midline, no adenopathy  Lungs: Scattered inspiratory wheezes throughout, otherwise clear to auscultation  Heart: Iregular rate and rhythm, s1/s2 auscultated, no murmurs. Afib 70-80's on tele  Abdomen: soft, non-tender, bowel sounds active  Extremities: no edema  Neurologic: not done    ECHO: 6/01/2021   Left Ventricle : The left ventricle is normal size. Left ventricular      systolic function is moderately reduced. Regional wall motion      abnormalities are noted. There is  hypokinesis in the septal and      inferior walls. LVEF is estimated 35-40%.      Right Ventricle : Right ventricular systolic function is mildly      reduced.      Atria : Left atrium is mildly dilated. Right atrium is mildly dilated.      Aortic Valve : Aortic valve is calcified. No significant valvular      aortic stenosis.      Mitral Valve : Mitral valve leaflets are calcified. Moderate mitral      annular calcification. Mild mitral regurgitation.      Tricuspid Valve : The tricuspid valve is normal in structure. There      is trace tricuspid regurgitation. The right atrial pressure is      estimated at 3 mmHg. Right ventricular systolic pressure is estimated      at 48 mmHg.  There is mild pulmonary hypertension.      Stress Test:    Stress 7/2020  - possible fixed inferior defect, no reversible defect     CT chest 5/2021 Moderate pleural effusion  Emphysema  No PE    Echo 7/15/2020  Conclusion      Left Ventricle : LVEF is 45-50%.      Right Ventricle : Right ventricular systolic function is reduced.      Atria : The atrial septum is aneurysmal. Possible PFO is noted.      Aortic Valve : Aortic valve leaflets are sclerotic but open well.      Mitral Valve : Mitral valve leaflets are calcified. Moderate mitral      annular calcification. Mild mitral regurgitation.      Great Vessels : The aortic root is normal in size.      Pericardium : There is no pericardial effusion.               Assessment / Acute Cardiac Problems:   1. History of Afib with RVR. On Aspirin 325 mg at home  2. Acute systolic CHF  3. CAD with history of stents - [address] [facility]  Many years ago  4. Cardiomyopathy EF 35-40% decreased from 45-50% 7/2020    Patient Active Problem List:     Acute on chronic combined systolic (congestive) and diastolic (congestive) heart failure (HCC)     Atrial fibrillation with rapid ventricular response (HCC)     CAD (coronary artery disease)     Hypertension     Hyperlipidemia     Hypothyroidism     Type 2 diabetes mellitus (HCC)     Unstable angina (HCC)     Severe malnutrition (HCC)     ROB (acute kidney injury) ([address]     Hyperkalemia      Plan of Treatment:   1. Hyperkalemia. Patient reveiced Kayexalate along with insulin and glucose yesterday for K 6.8. K was 2.8 this morning and Mag was 1.5. Replacements ordered. Discussed Potassium level with Dr. Jelly Arita. Per his recommendations, will give 40mEq of PO potassium and 20mEq IV as replacement along with Magnesium. And recheck labs after replacements. Creatinine normal this morning. No objection to cardiac cath per verbal conversation with Dr. Jelly Arita (Nephrology). Will proceed with Cardiac Cath, MAHI and CV post recheck of BMP and K >3.5. 2. Afib RVR. Remains in Afib with rates 70-80's. Continue heparin drip. Patient was on full strength Aspirin at home.  Briefly

## 2021-06-04 NOTE — PROCEDURES
[facility]  MAHI / Cardioversion procedure Note         Today's Date: 6/4/2021    Operators:  Primary: Dr Rahul Centeno (Attending)   Assistant: Rocio Anderson MD (Fellow)    Patient seen and examined. History and Physical reviewed. Labs reviewed. After informed consent was obtained with explanation of the risks and benefits, the patient was brought to Cath lab. All sedation was administered by the cardiologist. The oropharynx was pre anesthetisized with cetacaine spray. MAHI:    Structures:  LA: Mildly enlarged   SENG: No thrombus  RA: Normal  RV:  Normal  LV: Normal in size with normal systolic function   Estimated LVEF: 35%  Aorta: Mild atheromatous disease arch  Percardium: No pericardial effusion  Septum: PFO/ASD noted. Intracardiac left to right shunt via color Doppler. Intracardiac shunt via injection of agitated saline. Valves:  Mitral Valve: Structurally normal. Moderate regurgitation is identified. MR volume 25ml. MR ERO 0.14 cm2  Aortic Valve: The aortic valve is trileaflet and opens adequately. No regurgitiation is identified. Tricuspid valve: Structurally normal. No regurgitation is identified. Pulmonary valve: Normal. No significant regurgitation    No valvular vegetations or thrombus identified. Summary:     1. A MAHI was performed without complications. 2. Estimated LVEF 40%  3. No SENG thrombus   4. Moderate mitral regurgitation   5. Positive bubble study and color doppler suggesting PFO or a small ASD. CARDIOVERSION:  After an adequate level of sedation was achieved, 150J in biphasic synchronized delivery was administered. conversion to normal sinus rhythm. The patient awoke without complications.  A post procedure 12 L ECG was ordered      Electronically signed by Rocio Anderson MD on 6/4/2021 at 5:58 PM  Fellow, [address] [place] [address]      Attending Physician        Proceed with cardiac cath due to new decline in EF Re-evaluation of  MR once in NSR as OP to discuss treatment strategies.        Finn Samuel MD, [facility]

## 2021-06-04 NOTE — PROGRESS NOTES
Occupational Therapy   Occupational Therapy Initial Assessment  Date: 2021   Patient Name: Laila Doan  MRN: [id number]     : 1940    Chief Complaint: Shortness of breath and weakness     Date of Service: 2021    Discharge Recommendations:  Further therapy recommended at discharge. OT Equipment Recommendations  Equipment Needed: Yes  Mobility Devices: Sulma Larios; ADL Assistive Devices  Walker: Rolling  ADL Assistive Devices: Grab Bars - toilet;Grab Bars - shower    Assessment   Performance deficits / Impairments: Decreased functional mobility ; Decreased ADL status; Decreased high-level IADLs;Decreased balance;Decreased endurance;Decreased safe awareness  Assessment: pt exhibits above deficits and dizziness, impacting pt ability to safely and independently complete ADL's and functional mobility. pt would benefit from continued acute OT and OT after discharge. Treatment Diagnosis: A fib  Prognosis: Good  Decision Making: Medium Complexity  Patient Education: pt ed on OT role, POC, purpose of eval,  hand placement during transfers, and deep breathing. Good return. REQUIRES OT FOLLOW UP: Yes  Activity Tolerance  Activity Tolerance: Patient Tolerated treatment well;Patient limited by fatigue  Safety Devices  Safety Devices in place: Yes  Type of devices: Left in chair;Call light within reach; Chair alarm in place;Gait belt  Restraints  Initially in place: No         Patient Diagnosis(es): There were no encounter diagnoses. has a past medical history of Adhesive capsulitis left shoulder, Atrial fibrillation (HCC), CAD (coronary artery disease), CHF (congestive heart failure) ([facility] [address] Fracture of proximal end of left humerus, Hyperlipidemia, Hypertension, Hypothyroidism, and Type 2 diabetes mellitus ([facility] [address] has a past surgical history that includes Femur fracture surgery (Right, 2015); Cholecystectomy; partial hysterectomy (cervix not removed); and Femur fracture surgery (Left, 2015).     Treatment Diagnosis: A fib      Restrictions  Restrictions/Precautions  Restrictions/Precautions: Fall Risk (Up with A)  Required Braces or Orthoses?: No    Subjective   General  Patient assessed for rehabilitation services?: Yes  Family / Caregiver Present: No  Diagnosis: A fib  General Comment  Comments: RN ok'd pt for therapy.  pt agreeable/pleasant throughout  Patient Currently in Pain: Denies  Vital Signs  Patient Currently in Pain: Denies     Social/Functional History  Social/Functional History  Lives With: Spouse  Type of Home: House  Home Layout: Two level, Able to Live on Main level with bedroom/bathroom  Home Access: Stairs to enter with rails  Entrance Stairs - Number of Steps: 3  Entrance Stairs - Rails: Both  Bathroom Shower/Tub: Tub/Shower unit  Bathroom Toilet: Standard  Bathroom Equipment: Shower chair  Bathroom Accessibility: Accessible  Home Equipment: Standard walker, Caitlyn Sands (pt reports no DME usage at baseline)  ADL Assistance: [address]: Independent  Homemaking Responsibilities: Yes  Meal Prep Responsibility: Primary  Laundry Responsibility: Primary  Cleaning Responsibility: Primary  Ambulation Assistance: Independent  Transfer Assistance: Independent  Active : No (pt reports not driving for past few months due to dizziness)  Patient's  Info:  drives  Mode of Transportation: Car  Occupation: 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  [address]: spending time with  grandchildren  Additional Comments: pt reports  is home 24hr and able to assist PRN     Objective   Vision: Impaired  Vision Exceptions: Wears glasses at all times  Hearing: Exceptions to [facility]  Hearing Exceptions: Hard of hearing/hearing concerns (pt reports L ear has hole, from childhood)    Orientation  Overall Orientation Status: Within Functional Limits     Balance  Sitting Balance: Stand by assistance (~20 min, EOB and in chair)  Standing Balance: Contact guard assistance  Standing Balance  Time: ~5 min  Activity: pt completed static standing by bedside, marching in place, and functional mobility from bed>chair. Comment: pt completed first stand holding onto bedrail. pt unsteady during functional mobility and experienced one buckle at the knees, lowering self to sit EOB with CGA, taking sitting rest break before completing additional functional mobility. pt reported fatigue upon completion. pt O2 ~88% upon standing (~93% after ed on deep breathing)  ADL  Feeding: Modified independent ;Setup  Grooming: Modified independent ;Setup  UE Bathing: Stand by assistance  LE Bathing: Minimal assistance  UE Dressing: Stand by assistance  LE Dressing: Minimal assistance (OT facilitated pt donning socks utilizing figure 4 technique with SBA for safety, EOB.)  Toileting: Minimal assistance  Tone RUE  RUE Tone: Normotonic  Tone LUE  LUE Tone: Normotonic  Coordination  Movements Are Fluid And Coordinated: Yes     Bed mobility  Supine to Sit: Stand by assistance  Sit to Supine: Stand by assistance  Scooting: Stand by assistance  Comment: w/elevated HOB and use of bedrails  Transfers  Sit to stand: Contact guard assistance  Stand to sit: Contact guard assistance  Transfer Comments: w/RW     Cognition  Overall Cognitive Status: Exceptions  Safety Judgement: Decreased awareness of need for assistance  Insights: Decreased awareness of deficits  Cognition Comment: pt slightly impulsive, standing x2 before therapists were ready and requesting further functional mobility despite pt reported dizziness and unsteadiness while standing.         Sensation  Overall Sensation Status: WFL        LUE AROM (degrees)  LUE AROM : Exceptions  L Shoulder Flexion 0-180: 0-100  L Elbow Flexion 0-145: WFL  Left Hand AROM (degrees)  Left Hand AROM: WFL  RUE AROM (degrees)  RUE AROM : WFL  Right Hand AROM (degrees)  Right Hand AROM: WFL  LUE Strength  Gross LUE Strength: WFL  L Hand General: 5/5  RUE Strength Gross RUE Strength: WFL  R Hand General: 5/5    Plan   Plan  Times per week: 3-4x/wk    AM-PAC Score  AM-PAC Inpatient Daily Activity Raw Score: 20 (06/04/21 1324)  AM-PAC Inpatient ADL T-Scale Score : 42.03 (06/04/21 1324)  ADL Inpatient CMS 0-100% Score: 38.32 (06/04/21 1324)  ADL Inpatient CMS G-Code Modifier : Roosevelt Harrison (06/04/21 1324)    Goals  Short term goals  Time Frame for Short term goals: By discharge, pt will:  Short term goal 1: Complete UB ADL's with mod I. Short term goal 2: Complete LB ADL's with CGA and A/E PRN. Short term goal 3: Complete functional transfers/functional mobility with SBA, LRD, and no LOB or buckling. Short term goal 4: Tolerate ~8 min of static/dynamic standing balance with SBA and LRD to support performance of standing occupations. Short term goal 5: Demo good safety awareness during functional transfers/functional mobility with no VC. Short term goal 6: Tolerate ~25 min of meaningful activity to increase endurance for occupations.        Therapy Time   Individual Concurrent Group Co-treatment   Time In 0950         Time Out 1020         Minutes 30      Co-Eval with PT   Timed Code Treatment Minutes: [address]/OT

## 2021-06-05 ENCOUNTER — APPOINTMENT (OUTPATIENT)
Dept: GENERAL RADIOLOGY | Age: 81
DRG: 246 | End: 2021-06-05
Attending: FAMILY MEDICINE
Payer: MEDICARE

## 2021-06-05 PROBLEM — Z98.61 S/P PTCA (PERCUTANEOUS TRANSLUMINAL CORONARY ANGIOPLASTY): Status: ACTIVE | Noted: 2021-06-05

## 2021-06-05 LAB
ANION GAP SERPL CALCULATED.3IONS-SCNC: 15 MMOL/L (ref 9–17)
BNP INTERPRETATION: ABNORMAL
BUN BLDV-MCNC: 19 MG/DL (ref 8–23)
BUN/CREAT BLD: ABNORMAL (ref 9–20)
CALCIUM SERPL-MCNC: 7 MG/DL (ref 8.6–10.4)
CHLORIDE BLD-SCNC: 101 MMOL/L (ref 98–107)
CO2: 23 MMOL/L (ref 20–31)
CREAT SERPL-MCNC: 0.66 MG/DL (ref 0.5–0.9)
GFR AFRICAN AMERICAN: >60 ML/MIN
GFR NON-AFRICAN AMERICAN: >60 ML/MIN
GFR SERPL CREATININE-BSD FRML MDRD: ABNORMAL ML/MIN/{1.73_M2}
GFR SERPL CREATININE-BSD FRML MDRD: ABNORMAL ML/MIN/{1.73_M2}
GLUCOSE BLD-MCNC: 122 MG/DL (ref 65–105)
GLUCOSE BLD-MCNC: 125 MG/DL (ref 65–105)
GLUCOSE BLD-MCNC: 132 MG/DL (ref 65–105)
GLUCOSE BLD-MCNC: 156 MG/DL (ref 65–105)
GLUCOSE BLD-MCNC: 86 MG/DL (ref 70–99)
MAGNESIUM: 2.1 MG/DL (ref 1.6–2.6)
PLATELET # BLD: 180 K/UL (ref 138–453)
POTASSIUM SERPL-SCNC: 3.5 MMOL/L (ref 3.7–5.3)
PRO-BNP: 1848 PG/ML
SODIUM BLD-SCNC: 139 MMOL/L (ref 135–144)

## 2021-06-05 PROCEDURE — 36415 COLL VENOUS BLD VENIPUNCTURE: CPT

## 2021-06-05 PROCEDURE — 2580000003 HC RX 258: Performed by: STUDENT IN AN ORGANIZED HEALTH CARE EDUCATION/TRAINING PROGRAM

## 2021-06-05 PROCEDURE — 99232 SBSQ HOSP IP/OBS MODERATE 35: CPT | Performed by: INTERNAL MEDICINE

## 2021-06-05 PROCEDURE — 6360000002 HC RX W HCPCS: Performed by: INTERNAL MEDICINE

## 2021-06-05 PROCEDURE — 83880 ASSAY OF NATRIURETIC PEPTIDE: CPT

## 2021-06-05 PROCEDURE — 6370000000 HC RX 637 (ALT 250 FOR IP): Performed by: STUDENT IN AN ORGANIZED HEALTH CARE EDUCATION/TRAINING PROGRAM

## 2021-06-05 PROCEDURE — 97110 THERAPEUTIC EXERCISES: CPT

## 2021-06-05 PROCEDURE — 94640 AIRWAY INHALATION TREATMENT: CPT

## 2021-06-05 PROCEDURE — 2060000000 HC ICU INTERMEDIATE R&B

## 2021-06-05 PROCEDURE — 94761 N-INVAS EAR/PLS OXIMETRY MLT: CPT

## 2021-06-05 PROCEDURE — 85049 AUTOMATED PLATELET COUNT: CPT

## 2021-06-05 PROCEDURE — 83735 ASSAY OF MAGNESIUM: CPT

## 2021-06-05 PROCEDURE — 6370000000 HC RX 637 (ALT 250 FOR IP): Performed by: INTERNAL MEDICINE

## 2021-06-05 PROCEDURE — 80048 BASIC METABOLIC PNL TOTAL CA: CPT

## 2021-06-05 PROCEDURE — 97116 GAIT TRAINING THERAPY: CPT

## 2021-06-05 PROCEDURE — 71045 X-RAY EXAM CHEST 1 VIEW: CPT

## 2021-06-05 PROCEDURE — 2700000000 HC OXYGEN THERAPY PER DAY

## 2021-06-05 RX ORDER — AMIODARONE HYDROCHLORIDE 200 MG/1
200 TABLET ORAL DAILY
Status: DISCONTINUED | OUTPATIENT
Start: 2021-06-06 | End: 2021-06-06 | Stop reason: HOSPADM

## 2021-06-05 RX ORDER — FLUTICASONE PROPIONATE 50 MCG
1 SPRAY, SUSPENSION (ML) NASAL DAILY
Status: DISCONTINUED | OUTPATIENT
Start: 2021-06-05 | End: 2021-06-06 | Stop reason: HOSPADM

## 2021-06-05 RX ORDER — FUROSEMIDE 10 MG/ML
40 INJECTION INTRAMUSCULAR; INTRAVENOUS ONCE
Status: COMPLETED | OUTPATIENT
Start: 2021-06-05 | End: 2021-06-05

## 2021-06-05 RX ORDER — IPRATROPIUM BROMIDE AND ALBUTEROL SULFATE 2.5; .5 MG/3ML; MG/3ML
1 SOLUTION RESPIRATORY (INHALATION) EVERY 4 HOURS
Status: DISCONTINUED | OUTPATIENT
Start: 2021-06-05 | End: 2021-06-06

## 2021-06-05 RX ORDER — FUROSEMIDE 10 MG/ML
40 INJECTION INTRAMUSCULAR; INTRAVENOUS 2 TIMES DAILY
Status: DISCONTINUED | OUTPATIENT
Start: 2021-06-05 | End: 2021-06-06

## 2021-06-05 RX ADMIN — FAMOTIDINE 20 MG: 20 TABLET, FILM COATED ORAL at 10:06

## 2021-06-05 RX ADMIN — POTASSIUM BICARBONATE 40 MEQ: 782 TABLET, EFFERVESCENT ORAL at 10:04

## 2021-06-05 RX ADMIN — AMIODARONE HYDROCHLORIDE 200 MG: 200 TABLET ORAL at 10:06

## 2021-06-05 RX ADMIN — PANTOPRAZOLE SODIUM 40 MG: 40 TABLET, DELAYED RELEASE ORAL at 10:04

## 2021-06-05 RX ADMIN — IPRATROPIUM BROMIDE AND ALBUTEROL SULFATE 1 AMPULE: .5; 3 SOLUTION RESPIRATORY (INHALATION) at 11:20

## 2021-06-05 RX ADMIN — Medication 81 MG: at 10:05

## 2021-06-05 RX ADMIN — FLUTICASONE PROPIONATE 1 SPRAY: 50 SPRAY, METERED NASAL at 11:48

## 2021-06-05 RX ADMIN — MECLIZINE HYDROCHLORIDE 25 MG: 12.5 TABLET, FILM COATED ORAL at 21:46

## 2021-06-05 RX ADMIN — APIXABAN 2.5 MG: 2.5 TABLET, FILM COATED ORAL at 10:06

## 2021-06-05 RX ADMIN — CARVEDILOL 3.12 MG: 3.12 TABLET, FILM COATED ORAL at 17:45

## 2021-06-05 RX ADMIN — PRAVASTATIN SODIUM 10 MG: 20 TABLET ORAL at 10:05

## 2021-06-05 RX ADMIN — FLUDROCORTISONE ACETATE 0.1 MG: 0.1 TABLET ORAL at 10:05

## 2021-06-05 RX ADMIN — CLOPIDOGREL 75 MG: 75 TABLET, FILM COATED ORAL at 10:05

## 2021-06-05 RX ADMIN — APIXABAN 2.5 MG: 2.5 TABLET, FILM COATED ORAL at 21:46

## 2021-06-05 RX ADMIN — CARVEDILOL 3.12 MG: 3.12 TABLET, FILM COATED ORAL at 10:05

## 2021-06-05 RX ADMIN — FUROSEMIDE 40 MG: 10 INJECTION, SOLUTION INTRAMUSCULAR; INTRAVENOUS at 11:48

## 2021-06-05 RX ADMIN — FUROSEMIDE 40 MG: 10 INJECTION, SOLUTION INTRAMUSCULAR; INTRAVENOUS at 17:45

## 2021-06-05 RX ADMIN — SODIUM CHLORIDE, PRESERVATIVE FREE 10 ML: 5 INJECTION INTRAVENOUS at 21:45

## 2021-06-05 ASSESSMENT — PAIN SCALES - GENERAL: PAINLEVEL_OUTOF10: 0

## 2021-06-05 NOTE — PROGRESS NOTES
Hepatic: No results for input(s): AST, ALT, ALB, BILITOT, ALKPHOS in the last 72 hours. Troponin:   Recent Labs     06/03/21  1038   TROPHS 36*     BNP: No results for input(s): BNP in the last 72 hours. Lipids:   Recent Labs     06/03/21  0708   CHOL 106   HDL 50     INR: No results for input(s): INR in the last 72 hours. Objective:   Vitals: BP (!) 114/56   Pulse 64   Temp 97.3 °F (36.3 °C) (Axillary)   Resp 18   Ht 5' 3\" (1.6 m)   Wt 108 lb (49 kg)   SpO2 91%   BMI 19.13 kg/m²   General appearance: alert and cooperative with exam  HEENT: Head: Normocephalic, no lesions, without obvious abnormality. Neck: no JVD, trachea midline, no adenopathy  Lungs: Good air movement with fine scattered wheezes auscultated. Supplemental oxygen in use. Heart: Regular rate and rhythm, s1/s2 auscultated, no murmurs. Sinus Maury Flies 58 on tele  Right radial site is soft. No drainage. No ecchymosis. Distal pulses palpable. Abdomen: soft, non-tender, bowel sounds active  Extremities: no edema  Neurologic: not done    ECHO: 6/01/2021   Left Ventricle : The left ventricle is normal size. Left ventricular      systolic function is moderately reduced. Regional wall motion      abnormalities are noted. There is  hypokinesis in the septal and      inferior walls. LVEF is estimated 35-40%.      Right Ventricle : Right ventricular systolic function is mildly      reduced.      Atria : Left atrium is mildly dilated. Right atrium is mildly dilated.      Aortic Valve : Aortic valve is calcified. No significant valvular      aortic stenosis.      Mitral Valve : Mitral valve leaflets are calcified. Moderate mitral      annular calcification. Mild mitral regurgitation.      Tricuspid Valve : The tricuspid valve is normal in structure. There      is trace tricuspid regurgitation. The right atrial pressure is      estimated at 3 mmHg. Right ventricular systolic pressure is estimated      at 48 mmHg.  There is mild pulmonary hypertension. Stress Test:    Stress 7/2020  - possible fixed inferior defect, no reversible defect     CT chest 5/2021  Moderate pleural effusion  Emphysema  No PE    Echo 7/15/2020  Conclusion      Left Ventricle : LVEF is 45-50%.      Right Ventricle : Right ventricular systolic function is reduced.      Atria : The atrial septum is aneurysmal. Possible PFO is noted.      Aortic Valve : Aortic valve leaflets are sclerotic but open well.      Mitral Valve : Mitral valve leaflets are calcified. Moderate mitral      annular calcification. Mild mitral regurgitation.      Great Vessels : The aortic root is normal in size.      Pericardium : There is no pericardial effusion.       MAHI:  Structures:  LA: Mildly enlarged   SENG: No thrombus  RA: Normal  RV:  Normal  LV: Normal in size with normal systolic function   Estimated LVEF: 35%  Aorta: Mild atheromatous disease arch  Percardium: No pericardial effusion  Septum: PFO/ASD noted. Intracardiac left to right shunt via color Doppler. Intracardiac shunt via injection of agitated saline. Valves:  Mitral Valve: Structurally normal. Moderate regurgitation is identified. MR volume 25ml. MR ERO 0.14 cm2  Aortic Valve: The aortic valve is trileaflet and opens adequately. No regurgitiation is identified. Tricuspid valve: Structurally normal. No regurgitation is identified. Pulmonary valve: Normal. No significant regurgitation  No valvular vegetations or thrombus identified. Summary:   1. A MAHI was performed without complications. 2. Estimated LVEF 40%  3. No SENG thrombus   4. Moderate mitral regurgitation   5. Positive bubble study and color doppler suggesting PFO or a small ASD.      CARDIOVERSION:  After an adequate level of sedation was achieved, 150J in biphasic synchronized delivery was administered. conversion to normal sinus rhythm. The patient awoke without complications. A post procedure 12 L ECG was ordered.     Cardiac Cath 6/4/2021  Findings:    LMCA: Mild irregularities

## 2021-06-05 NOTE — PROGRESS NOTES
Physical Therapy  Facility/Department: [facility]  Daily Treatment Note  NAME: Yon Medina  : 1940  MRN: [id number]    Date of Service: 2021    Discharge Recommendations:  Patient would benefit from continued therapy after discharge   PT Equipment Recommendations  Equipment Needed: Yes  Mobility Devices: Jose Cuna: Rolling    Assessment   Body structures, Functions, Activity limitations: Decreased strength;Decreased balance;Decreased functional mobility ; Decreased endurance  Assessment: Pt grossly CGA with Em for safety d/t LOB standing. Pt amb 80' RW CGA. Pt would benefit from continued acute PT to address deficits. Prognosis: Good  REQUIRES PT FOLLOW UP: Yes  Activity Tolerance  Activity Tolerance: Patient limited by fatigue;Patient limited by endurance     Patient Diagnosis(es): There were no encounter diagnoses. has a past medical history of Adhesive capsulitis left shoulder, Atrial fibrillation (HCC), CAD (coronary artery disease), CHF (congestive heart failure) ([facility] [address] Fracture of proximal end of left humerus, Hyperlipidemia, Hypertension, Hypothyroidism, and Type 2 diabetes mellitus ([facility] [address] has a past surgical history that includes Femur fracture surgery (Right, 2015); Cholecystectomy; partial hysterectomy (cervix not removed); and Femur fracture surgery (Left, 2015). Restrictions  Restrictions/Precautions  Restrictions/Precautions: General Precautions, Fall Risk (up with assist)  Required Braces or Orthoses?: No  Position Activity Restriction  Other position/activity restrictions: up with assist  Subjective   General  Chart Reviewed: Yes  Response To Previous Treatment: Patient with no complaints from previous session. Family / Caregiver Present: No  Subjective  Subjective: RN and pt agreeable to PT.  Pt alert in in recliner  Pain Screening  Patient Currently in Pain: Denies  Vital Signs  Patient Currently in Pain: Denies       Orientation  Orientation  Overall Orientation Status: Within Functional Limits  Cognition      Objective   Bed mobility  Comment: in a recliner upon arrival and departure     Ambulation  Ambulation?: Yes  Ambulation 1  Surface: level tile  Device: No Device;Rolling Walker  Other Apparatus: O2  Assistance: Contact guard assistance  Quality of Gait: slowed, small steps, no LOB, unsteady  Gait Deviations: Slow Nallely;Decreased step length;Decreased step height  Distance: 80 ft  Stairs/Curb  Stairs?: No     Balance  Posture: Good  Sitting - Static: Good  Sitting - Dynamic: Good  Standing - Static: Good;-  Standing - Dynamic: Fair;+  Comments: RW  Exercises  Seated LE exercise program: Long Arc Quads, hip abduction/adduction, heel/toe raises, and marches.  Reps: x10  Goals  Short term goals  Time Frame for Short term goals: 14 visits  Short term goal 1: Pt will be Eladio bed mobility  Short term goal 2: Pt will be Eladio transfers  Short term goal 3: Pt will be Eladio amb 240' RW or least restrictive AD  Short term goal 4: Pt will navigate 4 steps Eladio, either or both rail use    Plan    Plan  Times per week: 5-6x/wk  Current Treatment Recommendations: Strengthening, Balance Training, Functional Mobility Training, Gait Training, Endurance Training, Transfer Training, Stair training, Home Exercise Program, Safety Education & Training, Patient/Caregiver Education & Training, Equipment Evaluation, Education, & procurement  Safety Devices  Type of devices: Call light within reach, Nurse notified, Gait belt, Patient at risk for falls, All fall risk precautions in place, Left in chair, Chair alarm in place  Restraints  Initially in place: No     Therapy Time   Individual Concurrent Group Co-treatment   Time In 1400         Time Out 1425         Minutes 25         Timed Code Treatment Minutes: [address] [facility]

## 2021-06-05 NOTE — PROGRESS NOTES
25 mg Oral Nightly    pantoprazole  40 mg Oral QAM AC    pravastatin  10 mg Oral Daily     Continuous Infusions:    sodium chloride      dextrose      dextrose       PRN Meds: sodium chloride flush, sodium chloride, nicotine, promethazine **OR** ondansetron, polyethylene glycol, acetaminophen **OR** acetaminophen, glucose, dextrose, glucagon (rDNA), dextrose, glucose, dextrose, glucagon (rDNA), dextrose    Data:     Past Medical History:   has a past medical history of Adhesive capsulitis left shoulder, Atrial fibrillation (HCC), CAD (coronary artery disease), CHF (congestive heart failure) ([facility] [address] Fracture of proximal end of left humerus, Hyperlipidemia, Hypertension, Hypothyroidism, and Type 2 diabetes mellitus ([facility] [address] Social History:   reports that she has quit smoking. She has a 20.00 pack-year smoking history. She has never used smokeless tobacco.     Family History:   Family History   Problem Relation Age of Onset    Heart Disease Father        Vitals:  BP (!) 114/56   Pulse 64   Temp 97.3 °F (36.3 °C) (Axillary)   Resp 22   Ht 5' 3\" (1.6 m)   Wt 108 lb (49 kg)   SpO2 99%   BMI 19.13 kg/m²   Temp (24hrs), Av.8 °F (36.6 °C), Min:97.3 °F (36.3 °C), Max:98.1 °F (36.7 °C)    Recent Labs     21  0709 21  1142 21  1614 21  1959   POCGLU 75 89 132* 87       I/O (24Hr):     Intake/Output Summary (Last 24 hours) at 2021 1213  Last data filed at 2021 0631  Gross per 24 hour   Intake 600 ml   Output 500 ml   Net 100 ml       Labs:  Hematology:  Recent Labs     21  0708 21  0612   WBC 11.7*  --    RBC 4.33  --    HGB 13.7  --    HCT 44.8  --    .5*  --    MCH 31.6  --    MCHC 30.6  --    RDW 14.1  --     180   MPV 9.2  --      Chemistry:  Recent Labs     21  0708 21  1038 21  1335 21  0821 21  1508 21  0612   * 132* 136 138  --  139   K 6.1* 6.3* 4.8 2.8* 4.9 3.5*   CL 97* 96* 97* 100  --  101   CO2 16* 17* 21 27  --  23   GLUCOSE 182* 145* 129* 76  --  86   BUN 14 15 16 17  --  19   CREATININE 1.12* 1.02* 1.19* 0.76  --  0.66   MG 1.7  --   --  1.5*  --  2.1   ANIONGAP 20* 19* 18* 11  --  15   LABGLOM 47* 52* 44* >60  --  >60   GFRAA 57* >60 53* >60  --  >60   CALCIUM 8.0* 7.9* 7.9* 7.1*  --  7.0*   PHOS  --   --   --  2.0* 5.0*  --    PROBNP 3,847*  --   --   --   --  1,848*   TROPHS  --  36*  --   --   --   --      Recent Labs     06/03/21  0708 06/03/21  1320 06/03/21  1626 06/03/21  1947 06/04/21  0709 06/04/21  1142 06/04/21  1614 06/04/21 1959   PROT  --   --  5.8*  --   --   --   --   --    CHOL 106  --   --   --   --   --   --   --    HDL 50  --   --   --   --   --   --   --    LDLCHOLESTEROL 43  --   --   --   --   --   --   --    CHOLHDLRATIO 2.1  --   --   --   --   --   --   --    TRIG 63  --   --   --   --   --   --   --    VLDL NOT REPORTED  --   --   --   --   --   --   --    POCGLU  --  145*  --  121* 75 89 132* 87     ABG:No results found for: POCPH, PHART, PH, POCPCO2, XTF9FNB, PCO2, POCPO2, PO2ART, PO2, POCHCO3, TCR6YAG, HCO3, NBEA, PBEA, BEART, BE, THGBART, THB, WIJ1ZPT, VPSH3OLO, E6IKLMRB, O2SAT, FIO2  No results found for: SPECIAL  No results found for: CULTURE    Radiology:  US RENAL COMPLETE    Result Date: 6/3/2021  No acute abnormality identified. Mild renal cortical thinning bilaterally.      XR CHEST PORTABLE    Result Date: 6/3/2021  Cardiomegaly with bilateral pleural effusions       Physical Examination:        General appearance:  Alert, mild distress  Mental Status:  oriented to person, place and time and normal affect  Lungs: fine crackles at the bases  Heart:  Irregularly irregular rate rhythm no m/r/g, +JVD  Abdomen:  soft, nontender, nondistended, normal bowel sounds, no masses, hepatomegaly, splenomegaly  Extremities:  no edema, redness, tenderness in the calves  Skin:  no gross lesions, rashes, induration    Assessment:        Hospital Problems         Last Modified POA    * (Principal) Acute on chronic combined systolic (congestive) and diastolic (congestive) heart failure (HCC) 6/5/2021 Yes    S/P PTCA/[place] - LCX (Dr Sincere Cr)  6/5/2021 Yes    Atrial fibrillation with rapid ventricular response ([address] 6/5/2021 Yes    CAD (coronary artery disease) 6/3/2021 Yes    Hypertension 6/3/2021 Yes    Hyperlipidemia 6/3/2021 Yes    Hypothyroidism 6/3/2021 Yes    Type 2 diabetes mellitus ([address] 6/3/2021 Yes    Unstable angina ([address] 6/3/2021 Yes    Severe malnutrition ([address] 6/3/2021 Yes    ROB (acute kidney injury) ([address] 6/3/2021 Yes    Hyperkalemia 6/3/2021 Yes    Hypokalemia 6/4/2021 Yes          Plan:        1. Acute decompensated systolic heart failure secondary to A. fib RVR -fluid positive after cardiac catheterization yesterday, give 40 mg Lasix now, continue twice daily dosing until tomorrow. Stat chest x-ray shows pleural effusion and pulmonary vascular congestion  2. Unstable angina -drug-eluting stent placed by cardiology yesterday, no chest pain now  3. Atrial fibrillation with rapid ventricular response -continue amiodarone, cardiology following,  4. CAD with multiple stents -continue Coreg, Lipitor, aspirin. 5. Hyperlipidemia -statins  6. Hypothyroidism-check TSH. 7. Diet-controlled diabetes mellitus-monitor blood sugar. Humalog as needed. Check A1c.  8. Chronic hypertension on Florinef -   9. Acute hyperkalemia -hypokalemia this morning. Replaced, nephrology following  10. Discharge plan: Diurese now, maintain overnight, supplemental oxygen oxygen as needed.   Possible discharge tomorrow    Alen Garza DO  6/5/2021  12:13 PM

## 2021-06-06 VITALS
BODY MASS INDEX: 19.14 KG/M2 | OXYGEN SATURATION: 98 % | TEMPERATURE: 98.3 F | DIASTOLIC BLOOD PRESSURE: 60 MMHG | RESPIRATION RATE: 20 BRPM | HEIGHT: 63 IN | HEART RATE: 67 BPM | SYSTOLIC BLOOD PRESSURE: 128 MMHG | WEIGHT: 108 LBS

## 2021-06-06 LAB
ANION GAP SERPL CALCULATED.3IONS-SCNC: 12 MMOL/L (ref 9–17)
BUN BLDV-MCNC: 15 MG/DL (ref 8–23)
BUN/CREAT BLD: ABNORMAL (ref 9–20)
CALCIUM SERPL-MCNC: 7.3 MG/DL (ref 8.6–10.4)
CHLORIDE BLD-SCNC: 95 MMOL/L (ref 98–107)
CO2: 29 MMOL/L (ref 20–31)
CREAT SERPL-MCNC: 0.63 MG/DL (ref 0.5–0.9)
GFR AFRICAN AMERICAN: >60 ML/MIN
GFR NON-AFRICAN AMERICAN: >60 ML/MIN
GFR SERPL CREATININE-BSD FRML MDRD: ABNORMAL ML/MIN/{1.73_M2}
GFR SERPL CREATININE-BSD FRML MDRD: ABNORMAL ML/MIN/{1.73_M2}
GLUCOSE BLD-MCNC: 103 MG/DL (ref 65–105)
GLUCOSE BLD-MCNC: 103 MG/DL (ref 70–99)
GLUCOSE BLD-MCNC: 119 MG/DL (ref 65–105)
MAGNESIUM: 1.7 MG/DL (ref 1.6–2.6)
POTASSIUM SERPL-SCNC: 2.7 MMOL/L (ref 3.7–5.3)
SODIUM BLD-SCNC: 136 MMOL/L (ref 135–144)

## 2021-06-06 PROCEDURE — 6370000000 HC RX 637 (ALT 250 FOR IP): Performed by: STUDENT IN AN ORGANIZED HEALTH CARE EDUCATION/TRAINING PROGRAM

## 2021-06-06 PROCEDURE — 36415 COLL VENOUS BLD VENIPUNCTURE: CPT

## 2021-06-06 PROCEDURE — 2580000003 HC RX 258: Performed by: STUDENT IN AN ORGANIZED HEALTH CARE EDUCATION/TRAINING PROGRAM

## 2021-06-06 PROCEDURE — 6370000000 HC RX 637 (ALT 250 FOR IP): Performed by: INTERNAL MEDICINE

## 2021-06-06 PROCEDURE — 80048 BASIC METABOLIC PNL TOTAL CA: CPT

## 2021-06-06 PROCEDURE — 6370000000 HC RX 637 (ALT 250 FOR IP): Performed by: NURSE PRACTITIONER

## 2021-06-06 PROCEDURE — 99239 HOSP IP/OBS DSCHRG MGMT >30: CPT | Performed by: INTERNAL MEDICINE

## 2021-06-06 PROCEDURE — 83735 ASSAY OF MAGNESIUM: CPT

## 2021-06-06 PROCEDURE — 94640 AIRWAY INHALATION TREATMENT: CPT

## 2021-06-06 PROCEDURE — 82947 ASSAY GLUCOSE BLOOD QUANT: CPT

## 2021-06-06 RX ORDER — ASPIRIN 81 MG/1
81 TABLET ORAL DAILY
Qty: 30 TABLET | Refills: 3 | Status: SHIPPED | OUTPATIENT
Start: 2021-06-06

## 2021-06-06 RX ORDER — POTASSIUM CHLORIDE 20 MEQ/1
20 TABLET, EXTENDED RELEASE ORAL DAILY
Status: DISCONTINUED | OUTPATIENT
Start: 2021-06-06 | End: 2021-06-06 | Stop reason: HOSPADM

## 2021-06-06 RX ORDER — LISINOPRIL 2.5 MG/1
2.5 TABLET ORAL DAILY
Status: DISCONTINUED | OUTPATIENT
Start: 2021-06-06 | End: 2021-06-06

## 2021-06-06 RX ORDER — MAGNESIUM SULFATE 1 G/100ML
1000 INJECTION INTRAVENOUS ONCE
Status: DISCONTINUED | OUTPATIENT
Start: 2021-06-06 | End: 2021-06-06

## 2021-06-06 RX ORDER — CARVEDILOL 3.12 MG/1
3.12 TABLET ORAL 2 TIMES DAILY WITH MEALS
Qty: 60 TABLET | Refills: 3 | Status: SHIPPED | OUTPATIENT
Start: 2021-06-06

## 2021-06-06 RX ORDER — IPRATROPIUM BROMIDE AND ALBUTEROL SULFATE 2.5; .5 MG/3ML; MG/3ML
1 SOLUTION RESPIRATORY (INHALATION) EVERY 4 HOURS
Status: DISCONTINUED | OUTPATIENT
Start: 2021-06-06 | End: 2021-06-06 | Stop reason: HOSPADM

## 2021-06-06 RX ORDER — POTASSIUM CHLORIDE 20 MEQ/1
20 TABLET, EXTENDED RELEASE ORAL DAILY
Qty: 60 TABLET | Refills: 3 | Status: SHIPPED | OUTPATIENT
Start: 2021-06-06

## 2021-06-06 RX ORDER — CLOPIDOGREL BISULFATE 75 MG/1
75 TABLET ORAL DAILY
Qty: 30 TABLET | Refills: 3 | Status: SHIPPED | OUTPATIENT
Start: 2021-06-06

## 2021-06-06 RX ORDER — FUROSEMIDE 40 MG/1
40 TABLET ORAL DAILY
Status: DISCONTINUED | OUTPATIENT
Start: 2021-06-06 | End: 2021-06-06 | Stop reason: HOSPADM

## 2021-06-06 RX ORDER — FUROSEMIDE 40 MG/1
40 TABLET ORAL DAILY
Qty: 60 TABLET | Refills: 3 | Status: SHIPPED | OUTPATIENT
Start: 2021-06-06

## 2021-06-06 RX ORDER — MAGNESIUM OXIDE 400 MG/1
400 TABLET ORAL DAILY
Qty: 30 TABLET | Refills: 1 | Status: SHIPPED | OUTPATIENT
Start: 2021-06-06

## 2021-06-06 RX ORDER — AMIODARONE HYDROCHLORIDE 200 MG/1
200 TABLET ORAL DAILY
Qty: 30 TABLET | Refills: 0 | Status: SHIPPED | OUTPATIENT
Start: 2021-06-06 | End: 2021-07-06

## 2021-06-06 RX ADMIN — PANTOPRAZOLE SODIUM 40 MG: 40 TABLET, DELAYED RELEASE ORAL at 07:55

## 2021-06-06 RX ADMIN — APIXABAN 2.5 MG: 2.5 TABLET, FILM COATED ORAL at 07:55

## 2021-06-06 RX ADMIN — AMIODARONE HYDROCHLORIDE 200 MG: 200 TABLET ORAL at 07:55

## 2021-06-06 RX ADMIN — Medication 81 MG: at 07:55

## 2021-06-06 RX ADMIN — POTASSIUM BICARBONATE 40 MEQ: 782 TABLET, EFFERVESCENT ORAL at 07:48

## 2021-06-06 RX ADMIN — POTASSIUM CHLORIDE 20 MEQ: 1500 TABLET, EXTENDED RELEASE ORAL at 09:41

## 2021-06-06 RX ADMIN — FLUDROCORTISONE ACETATE 0.1 MG: 0.1 TABLET ORAL at 07:55

## 2021-06-06 RX ADMIN — PRAVASTATIN SODIUM 10 MG: 20 TABLET ORAL at 07:55

## 2021-06-06 RX ADMIN — IPRATROPIUM BROMIDE AND ALBUTEROL SULFATE 1 AMPULE: .5; 3 SOLUTION RESPIRATORY (INHALATION) at 05:54

## 2021-06-06 RX ADMIN — SODIUM CHLORIDE, PRESERVATIVE FREE 10 ML: 5 INJECTION INTRAVENOUS at 07:56

## 2021-06-06 RX ADMIN — CLOPIDOGREL 75 MG: 75 TABLET, FILM COATED ORAL at 07:55

## 2021-06-06 RX ADMIN — FAMOTIDINE 20 MG: 20 TABLET, FILM COATED ORAL at 07:55

## 2021-06-06 RX ADMIN — IPRATROPIUM BROMIDE AND ALBUTEROL SULFATE 1 AMPULE: .5; 3 SOLUTION RESPIRATORY (INHALATION) at 12:44

## 2021-06-06 RX ADMIN — MAGNESIUM GLUCONATE 500 MG ORAL TABLET 400 MG: 500 TABLET ORAL at 09:41

## 2021-06-06 RX ADMIN — CARVEDILOL 3.12 MG: 3.12 TABLET, FILM COATED ORAL at 07:55

## 2021-06-06 RX ADMIN — FLUTICASONE PROPIONATE 1 SPRAY: 50 SPRAY, METERED NASAL at 07:57

## 2021-06-06 NOTE — PROGRESS NOTES
Home Oxygen Evaluation    Home Oxygen Evaluation completed. Patient is on 3 liters per minute via NC at rest.  Resting SpO2 = 96%  Resting SpO2 on room air = 86%    SpO2 on room air with exercise = NA  SpO2 on oxygen with exercise = NA    Nocturnal Oximetry with patient on room air is recommended is SpO2 is between 89% and 95% (requires additional order).     CHRISTIN JONES RCP  8:42 AM

## 2021-06-06 NOTE — PROGRESS NOTES
[facility]  Office: [address], DO, Haily Engle, DO, Audrey [place]ap[place], DO, Marlo Kanner Gary, DO, Criselda Sheikh MD, Reena Johnson MD, Joselo Kumar MD, Roopa Kothari MD, Jessica Smith MD, Tamiko Novoa MD, Christiana Bunch MD, Misha Hay MD, Rachael Del Real DO, Haseeb Blanton MD, Barbara Beverly, DO, Barron Morse MD,  Nubia Fajardo, DO, Sondra Upton MD, Kenya Justice MD, Saira Hester MD, Jonathon Woo MD, Bandar Suh, Abiel Matute, CNP, Gerardo Bustillos, CNP, Minoo Clifford, CNS, Inderjit Balderas, CNP, Jessica Ledbetter, CNP, Feli Lockett, CNP, Nas Hager, CNP, Vazquez Cooper, CNP, Rashida Rivera PA-C, Aj Novak, [facility], Bryant Soliman, CNP, Jersey Escalante, CNP, Magdi Rose, CNP, Ofe Ferrell, CNP, Valentina Campo, CNP, Tim Rivas, [address] [place] [address]    Progress Note    6/6/2021    7:44 AM    Name:   Pippa Willis  MRN:     [id number]     Acct:      [de-identified]   Room:   2018/2018-01  IP Day:  3  Admit Date:  6/3/2021  1:23 AM    PCP:   Kaden Escoto  Code Status:  DNR-CCA    Subjective:     C/C: Chest pain, SOB    Interval History Status: improved. Patient developed respiratory distress yesterday, suspected secondary to exacerbation CHF. Patient was given diuretic therapy and quickly improved her oxygen requirement. Upon my examination this morning patient is not short of breath but does continue to desaturate off oxygen. Patient uses home oxygen at night, will obtain home O2 eval and discharge today. Clarified with cardiology this morning, plan for triple therapy with aspirin, Plavix and Eliquis. Patient's potassium was also decreased at 2.7 and magnesium 1.7. Started supplementation, will hold Lasix this morning    Brief History:     The patient is a 79 y.o.   Non-/non  female who presents with breathing difficulty and she is admitted to the hospital for the management of  Atrial fibrillation with rapid ventricular response ([facility] [address] Patient was transferred from [facility] where she had been admitted for 5 days after presenting with difficulty with difficulty in breathing. Patient reported that she had not been feeling good for about a week and had decreased activity, fatigue. She also reported chest pain on mild exertion. Patient denied any fever, cough, expectoration, nausea, vomiting. She has known history of CAD with multiple stents, chronic systolic heart failure with EF 25 to 30%, hypertension, hyperlipidemia. She is  former smoker and quit more than 30 years before. Patient was found to be in A. fib with RVR and was treated with amiodarone infusion. She also required digoxin IV for heart rate control. Patient was eval by cardiology and recommended transfer to [facility] for further treatment and possible heart catheterization. Review of Systems:     Constitutional:  negative for chills, fevers, sweats  Respiratory:  negative for cough, dyspnea on exertion, shortness of breath, wheezing  Cardiovascular:  negative for chest pain, chest pressure/discomfort, lower extremity edema, palpitations  Gastrointestinal:  negative for abdominal pain, constipation, diarrhea, nausea, vomiting  Neurological:  negative for dizziness, headache    Medications: Allergies:     Allergies   Allergen Reactions    Fosamax [Alendronate] Other (See Comments)    Lisinopril Other (See Comments)     cough    Pioglitazone Other (See Comments)       Current Meds:   Scheduled Meds:    [Held by provider] furosemide  40 mg Oral Daily    ipratropium-albuterol  1 ampule Inhalation Q4H    potassium bicarb-citric acid  40 mEq Oral Once    magnesium oxide  400 mg Oral Daily    potassium chloride  20 mEq Oral Daily    fluticasone  1 spray Each Nostril Daily    amiodarone  200 mg Oral Daily    clopidogrel  75 mg Oral Daily    apixaban  2.5 mg Oral BID    aspirin  81 mg Oral Daily    sodium chloride flush  5-40 mL Intravenous 2 times per day    carvedilol  3.125 mg Oral BID WC    famotidine  20 mg Oral Daily    insulin lispro  0-12 Units Subcutaneous TID WC    insulin lispro  0-6 Units Subcutaneous Nightly    insulin regular  10 Units Intravenous Once    dextrose  25 g Intravenous Once    fludrocortisone  0.1 mg Oral Daily    meclizine  25 mg Oral Nightly    pantoprazole  40 mg Oral QAM AC    pravastatin  10 mg Oral Daily     Continuous Infusions:    sodium chloride      dextrose      dextrose       PRN Meds: sodium chloride flush, sodium chloride, nicotine, promethazine **OR** ondansetron, polyethylene glycol, acetaminophen **OR** acetaminophen, glucose, dextrose, glucagon (rDNA), dextrose, glucose, dextrose, glucagon (rDNA), dextrose    Data:     Past Medical History:   has a past medical history of Adhesive capsulitis left shoulder, Atrial fibrillation (HCC), CAD (coronary artery disease), CHF (congestive heart failure) ([facility] [address] Fracture of proximal end of left humerus, Hyperlipidemia, Hypertension, Hypothyroidism, and Type 2 diabetes mellitus ([facility] [address] Social History:   reports that she has quit smoking. She has a 20.00 pack-year smoking history. She has never used smokeless tobacco.     Family History:   Family History   Problem Relation Age of Onset    Heart Disease Father        Vitals:  BP (!) 127/51   Pulse 72   Temp 98.3 °F (36.8 °C) (Oral)   Resp 20   Ht 5' 3\" (1.6 m)   Wt 108 lb (49 kg)   SpO2 98%   BMI 19.13 kg/m²   Temp (24hrs), Av.9 °F (36.6 °C), Min:97.3 °F (36.3 °C), Max:98.3 °F (36.8 °C)    Recent Labs     21  1959 21  1233 21  1741 21  2131   POCGLU 87 122* 156* 125*       I/O (24Hr):     Intake/Output Summary (Last 24 hours) at 2021 0736  Last data filed at 2021 2200  Gross per 24 hour   Intake    Output 1800 ml   Net -1800 ml       Labs:  Hematology:  Recent Labs     21  0612        Chemistry: Recent Labs     06/03/21  1038 06/04/21  0821 06/04/21  1508 06/05/21  0612 06/06/21  0634   * 138  --  139 136   K 6.3* 2.8* 4.9 3.5* 2.7*   CL 96* 100  --  101 95*   CO2 17* 27  --  23 29   GLUCOSE 145* 76  --  86 103*   BUN 15 17  --  19 15   CREATININE 1.02* 0.76  --  0.66 0.63   MG  --  1.5*  --  2.1 1.7   ANIONGAP 19* 11  --  15 12   LABGLOM 52* >60  --  >60 >60   GFRAA >60 >60  --  >60 >60   CALCIUM 7.9* 7.1*  --  7.0* 7.3*   PHOS  --  2.0* 5.0*  --   --    PROBNP  --   --   --  1,848*  --    TROPHS 36*  --   --   --   --      Recent Labs     06/03/21  1626 06/04/21  1142 06/04/21  1614 06/04/21  1959 06/05/21  1233 06/05/21  1741 06/05/21  2131   PROT 5.8*  --   --   --   --   --   --    POCGLU  --  89 132* 87 122* 156* 125*     ABG:No results found for: POCPH, PHART, PH, POCPCO2, XGT5BSJ, PCO2, POCPO2, PO2ART, PO2, POCHCO3, SOG4WGX, HCO3, NBEA, PBEA, BEART, BE, THGBART, THB, MEJ6FXI, XBDY9MVU, D0XDREFA, O2SAT, FIO2  No results found for: SPECIAL  No results found for: CULTURE    Radiology:  US RENAL COMPLETE    Result Date: 6/3/2021  No acute abnormality identified. Mild renal cortical thinning bilaterally.      XR CHEST PORTABLE    Result Date: 6/3/2021  Cardiomegaly with bilateral pleural effusions       Physical Examination:        General appearance:  Alert, mild distress  Mental Status:  oriented to person, place and time and normal affect  Lungs: fine crackles at the bases  Heart:  Irregularly irregular rate rhythm no m/r/g, +JVD  Abdomen:  soft, nontender, nondistended, normal bowel sounds, no masses, hepatomegaly, splenomegaly  Extremities:  no edema, redness, tenderness in the calves  Skin:  no gross lesions, rashes, induration    Assessment:        Hospital Problems         Last Modified POA    * (Principal) Acute on chronic combined systolic (congestive) and diastolic (congestive) heart failure ([facility] [address] 6/5/2021 Yes    S/P PTCA/[place] - LCX (Dr Sincere Cr)  6/5/2021 Yes    Atrial fibrillation with rapid ventricular response ([address] 6/5/2021 Yes    CAD (coronary artery disease) 6/3/2021 Yes    Hypertension 6/3/2021 Yes    Hyperlipidemia 6/3/2021 Yes    Hypothyroidism 6/3/2021 Yes    Type 2 diabetes mellitus ([address] 6/3/2021 Yes    Unstable angina ([address] 6/3/2021 Yes    Severe malnutrition ([address] 6/3/2021 Yes    ROB (acute kidney injury) ([facility] [address] 6/3/2021 Yes    Hyperkalemia 6/3/2021 Yes    Hypokalemia 6/4/2021 Yes          Plan:        1. Acute decompensated systolic heart failure secondary to A. fib RVR -patient's oxygen requirements greatly improved this morning, continue 40 mg Lasix daily, add potassium and magnesium supplementation  2. Unstable angina -drug-eluting stent placed by cardiology yesterday, no chest pain now  3. Atrial fibrillation with rapid ventricular response -continue amiodarone, cardiology following,  4. CAD with multiple stents -continue Coreg, Lipitor, aspirin. 5. Hyperlipidemia -statins  6. Diet-controlled diabetes mellitus-monitor blood sugar. Humalog as needed. Check A1c.  7. Chronic hypertension on Florinef -   8. Discharge plan: Continue daily potassium 20 mg, magnesium 400 mg. Continue Lasix 40 mg. Patient has a cardiology follow-up this week. We will advised to see PCP in 1 week, she was also advised to obtain a pulse oximeter, will be sent home with home O2.   She will get a basic metabolic panel tomorrow or Tuesday and results will be sent to her cardiologist and PCP    Ernesto Gutierrez DO  6/6/2021  7:44 AM

## 2021-06-06 NOTE — DISCHARGE INSTR - DIET
 Good nutrition is important when healing from an illness, injury, or surgery. Follow any nutrition recommendations given to you during your hospital stay.  If you were given an oral nutrition supplement while in the hospital, continue to take this supplement at home. You can take it with meals, in-between meals, and/or before bedtime. These supplements can be purchased at most local grocery stores, pharmacies, and chain super-stores.  If you have any questions about your diet or nutrition, call the hospital and ask for the dietitian. Heart-Healthy Diet: Care Instructions  Your Care Instructions     A heart-healthy diet has lots of vegetables, fruits, nuts, beans, and whole grains, and is low in salt. It limits foods that are high in saturated fat, such as meats, cheeses, and fried foods. It may be hard to change your diet, but even small changes can lower your risk of heart attack and heart disease. Follow-up care is a key part of your treatment and safety. Be sure to make and go to all appointments, and call your doctor if you are having problems. It's also a good idea to know your test results and keep a list of the medicines you take. How can you care for yourself at home? Watch your portions  · Use food labels to learn what the recommended servings are for the foods you eat. · Eat only the number of calories you need to stay at a healthy weight. If you need to lose weight, eat fewer calories than your body burns (through exercise and other physical activity). Eat more fruits and vegetables  · Eat a variety of fruit and vegetables every day. Dark green, deep orange, red, or yellow fruits and vegetables are especially good for you. Examples include spinach, carrots, peaches, and berries. · Keep carrots, celery, and other veggies handy for snacks. Buy fruit that is in season and store it where you can see it so that you will be tempted to eat it.   · Cook dishes that have a lot of veggies in them, such as stir-fries and soups. Limit saturated fat  · Read food labels, and try to avoid saturated fats. They increase your risk of heart disease. · Use olive or canola oil when you cook. · Bake, broil, grill, or steam foods instead of frying them. · Choose lean meats instead of high-fat meats such as hot dogs and sausages. Cut off all visible fat when you prepare meat. · Eat fish, skinless poultry, and meat alternatives such as soy products instead of high-fat meats. Soy products, such as tofu, may be especially good for your heart. · Choose low-fat or fat-free milk and dairy products. Eat foods high in fiber  · Eat a variety of grain products every day. Include whole-grain foods that have lots of fiber and nutrients. Examples of whole-grain foods include oats, whole wheat bread, and brown rice. · Buy whole-grain breads and cereals, instead of white bread or pastries. Limit salt and sodium  · Limit how much salt and sodium you eat to help lower your blood pressure. · Taste food before you salt it. Add only a little salt when you think you need it. With time, your taste buds will adjust to less salt. · Eat fewer snack items, fast foods, and other high-salt, processed foods. Check food labels for the amount of sodium in packaged foods. · Choose low-sodium versions of canned goods (such as soups, vegetables, and beans). Limit sugar  · Limit drinks and foods with added sugar. These include candy, desserts, and soda pop. Limit alcohol  · Limit alcohol to no more than 2 drinks a day for men and 1 drink a day for women. Too much alcohol can cause health problems. When should you call for help? Watch closely for changes in your health, and be sure to contact your doctor if:    · You would like help planning heart-healthy meals. Where can you learn more? Go to https://keon.healthNEHP. org and sign in to your "[employer] account.  Enter V137 in the [employer] box to learn more about \"Heart-Healthy Diet: Care Instructions. \"     If you do not have an account, please click on the \"Sign Up Now\" link. Current as of: December 17, 2020               Content Version: 12.8  © 2006-2021 [employer]wise[employer] Inc[employer]. Care instructions adapted under license by [facility] ([facility]). If you have questions about a medical condition or this instruction, always ask your healthcare professional. [address] any warranty or liability for your use of this information.

## 2021-06-06 NOTE — PLAN OF CARE
Problem: Falls - Risk of:  Goal: Will remain free from falls  Description: Will remain free from falls  Outcome: Ongoing  Goal: Absence of physical injury  Description: Absence of physical injury  Outcome: Ongoing     Problem:  Activity:  Goal: Ability to tolerate increased activity will improve  Description: Ability to tolerate increased activity will improve  Outcome: Ongoing     Problem: OXYGENATION/RESPIRATORY FUNCTION  Goal: Patient will maintain patent airway  Outcome: Ongoing  Goal: Patient will achieve/maintain normal respiratory rate/effort  Description: Respiratory rate and effort will be within normal limits for the patient  Outcome: Ongoing     Problem: HEMODYNAMIC STATUS  Goal: Patient has stable vital signs and fluid balance  Outcome: Ongoing     Problem: FLUID AND ELECTROLYTE IMBALANCE  Goal: Fluid and electrolyte balance are achieved/maintained  Outcome: Ongoing     Problem: ACTIVITY INTOLERANCE/IMPAIRED MOBILITY  Goal: Mobility/activity is maintained at optimum level for patient  Outcome: Ongoing     Problem: Nutrition  Goal: Optimal nutrition therapy  Outcome: Ongoing     Problem: Skin Integrity:  Goal: Will show no infection signs and symptoms  Description: Will show no infection signs and symptoms  Outcome: Ongoing  Goal: Absence of new skin breakdown  Description: Absence of new skin breakdown  Outcome: Ongoing

## 2021-06-06 NOTE — PROGRESS NOTES
CLINICAL PHARMACY NOTE: MEDS TO BEDS    Total # of Prescriptions Filled: 8   The following medications were delivered to the patient:  · Amiodarone 200mg  · Eliquis 2.5mg  · Clopidogrel 75mg  · Furosemide 40mg  · Magnesium Oxide 400mg  · Potassium Chloride 20meq  · Aspirin 81mg  · Carvedilol 3.125mg    Additional Documentation: delivered to patient in room 2018 6/6 at 12:34pm. Co-pay paid with credit on [employer] ($60.22).

## 2021-06-06 NOTE — DISCHARGE INSTR - COC
Continuity of Care Form    Patient Name: Roderick Vallejo   :  1940  MRN:  [id number]    Admit date:  6/3/2021  Discharge date:  ***    Code Status Order: DNR-CCA   Advance Directives:   [facility] Documentation       Date/Time Healthcare Directive Type of Healthcare Directive Copy in [address] Agent's Name Healthcare Agent's Phone Number    21 8728      Yes, copy in chart          21 0201  Yes, patient has an advance directive for healthcare treatment  Health care treatment directive                    Admitting Physician:  Allegra Bacon DO  PCP: Madison Mcginnis    Discharging Nurse: [facility] Unit/Room#:   Discharging Unit Phone Number: ***    Emergency Contact:   Extended Emergency Contact Information  Primary Emergency Contact: Pankaj Pal  Address: [place] [address] [address] [place] [address] Phone: [phone]  Relation: Spouse    Past Surgical History:  Past Surgical History:   Procedure Laterality Date    CHOLECYSTECTOMY      FEMUR FRACTURE SURGERY Right 2015    FEMUR FRACTURE SURGERY Left 2015    PARTIAL HYSTERECTOMY         Immunization History: There is no immunization history on file for this patient.     Active Problems:  Patient Active Problem List   Diagnosis Code    Acute on chronic combined systolic (congestive) and diastolic (congestive) heart failure (HCC) I50.43    Atrial fibrillation with rapid ventricular response (HCC) I48.91    CAD (coronary artery disease) I25.10    Hypertension I10    Hyperlipidemia E78.5    Hypothyroidism E03.9    Type 2 diabetes mellitus (HCC) E11.9    Unstable angina (HCC) I20.0    Severe malnutrition ([facility] [address] E43    ROB (acute kidney injury) ([facility] [address] N17.9    Hyperkalemia E87.5    Hypokalemia E87.6    S/P PTCA/[place] - LCX (Dr Michael Bird)  Z98.61       Isolation/Infection:   Isolation            No Isolation          Patient Infection Status None to display            Nurse Assessment:  Last Vital Signs: BP (!) 127/51   Pulse 72   Temp 98.3 °F (36.8 °C) (Oral)   Resp 20   Ht 5' 3\" (1.6 m)   Wt 108 lb (49 kg)   SpO2 98%   BMI 19.13 kg/m²     Last documented pain score (0-10 scale): Pain Level: 0  Last Weight:   Wt Readings from Last 1 Encounters:   06/03/21 108 lb (49 kg)     Mental Status:  {IP PT MENTAL STATUS:20030:::0}    IV Access:  { RIGO IV ACCESS:812821772:::0}    Nursing Mobility/ADLs:  Walking   {CHP DME ADLs:009785778:::0}  Transfer  {CHP DME ADLs:712320808:::0}  Bathing  {CHP DME ADLs:414211273:::0}  Dressing  {CHP DME ADLs:279375889:::0}  Toileting  {CHP DME ADLs:019383984:::0}  Feeding  {CHP DME ADLs:431974624:::0}  Med Admin  {CHP DME ADLs:131000956:::0}  Med Delivery   { RIGO MED Delivery:985404416:::0}    Wound Care Documentation and Therapy:        Elimination:  Continence:    Bowel: {YES / EO:37880}  Bladder: {YES / ZK:75079}  Urinary Catheter: {Urinary Catheter:982446155:::0}   Colostomy/Ileostomy/Ileal Conduit: {YES / JE:54839}       Date of Last BM: ***    Intake/Output Summary (Last 24 hours) at 6/6/2021 0736  Last data filed at 6/5/2021 2200  Gross per 24 hour   Intake    Output 1800 ml   Net -1800 ml     I/O last 3 completed shifts:  In: -   Out: 1800 [Urine:1800]    Safety Concerns:     [address] Safety Concerns:854741722:::0}    Impairments/Disabilities:      [address] Impairments/Disabilities:512405359:::0}    Nutrition Therapy:  Current Nutrition Therapy:   [address] Diet List:535229551:::0}    Routes of Feeding: {CHP DME Other Feedings:643016449:::0}  Liquids: {Slp liquid thickness:13793}  Daily Fluid Restriction: {CHP DME Yes amt example:129313881:::0}  Last Modified Barium Swallow with Video (Video Swallowing Test): {Done Not Done WXKZ:029504252:::3}    Treatments at the Time of Hospital Discharge:   Respiratory Treatments: ***  Oxygen Therapy:  {Therapy; copd oxygen:31797:::0}  Ventilator:    { CC Vent

## 2021-06-06 NOTE — CARE COORDINATION
Qualified for Home O2 24/7. Faxed face sheet, testing, O2 order, face to face, and MAR to [facility].  Talked with Gabriela Mota

## 2021-06-06 NOTE — DISCHARGE SUMMARY
[facility]  Office: [address] [facility], DO, Gwen Faustin DO, Kirby Winn, DO, [facility], DO, Rama Rizo MD, Linda Son MD, Valentina Ochoa MD, Miri Boggs MD, Eva Callahan MD, Tara Walker MD, Karolina Hull MD, Sara Honeycutt MD, Jose Antonio Delgado DO, Srinivas Becerra MD, Dayna Kothari DO, Lul Landers MD,  José Miguel Mendoza, DO, Jorge Luis Denis MD, Yoly Sahu MD, Caitlin Kaiser MD, Deneen Weaver MD, Hildegard Bumpers, [facility], [facility], CNP, [facility], Feroz Lopez, CNS, Deepak Aguilar, CNP, Lurdes Covarrubias, CNP, Rosana Cotton, CNP, Demar Du, CNP, Letty Samsno, CNP, Eliel Snider PA-C, Georgie Velazquez, [facility] [place], Beata Reardon, CNP, Margaret Gomez, CNP, Sandra Estrada, CNP, Leopold Saras, CNP, Florecita Maldonado, CNP, Selwyn Mccall, [address] [place] [address]    Discharge Summary     Patient ID: Yon Medina  :  1940   MRN: [id number]     ACCOUNT:  [de-identified]   Patient's PCP: Jose Phillips  Admit Date: 6/3/2021   Discharge Date: 2021  Length of Stay: 3  Code Status:  Prior  Admitting Physician: Dayna Kothari DO  Discharge Physician: Dayna Kothari DO     Active Discharge Diagnoses:     Hospital Problem Lists:  Principal Problem:    Acute on chronic combined systolic (congestive) and diastolic (congestive) heart failure [facility])  Active Problems:    S/P PTCA/NEVILLE - LCX (Dr Suzanne Phelps)     Atrial fibrillation with rapid ventricular response ([facility]    CAD (coronary artery disease)    Hypertension    Hyperlipidemia    Hypothyroidism    Type 2 diabetes mellitus ([facility]    Unstable angina (HCC)    Severe malnutrition ([facility]    [facility]    Hyperkalemia    Hypokalemia  Resolved Problems:    * No resolved hospital problems.  *      Admission Condition:  good     Discharged Condition: good    Hospital Stay:     Hospital Course:  Yon Medina is a 79 y.o. female who was admitted for the management of   Acute on chronic combined systolic (congestive) and diastolic (congestive) heart failure ([facility] , presented to ER with   Patient was transferred from [facility] where she had been admitted for 5 days after presenting with difficulty with difficulty in breathing.  Patient reported that she had not been feeling good for about a week and had decreased activity, fatigue.  She also reported chest pain on mild exertion.  Patient denied any fever, cough, expectoration, nausea, vomiting.  She has known history of CAD with multiple stents, chronic systolic heart failure with EF 25 to 30%, hypertension, hyperlipidemia. Virgie Sanchez is  former smoker and quit more than 30 years before. Patient was found to be in A. fib with RVR and was treated with amiodarone infusion.  She also required digoxin IV for heart rate control.  Patient was eval by cardiology and recommended transfer to [facility] for further treatment and possible heart catheterization. Patient was taken for heart catheterization was reported below. The following day patient continued shortness of breath, she stayed overnight for IV diuresis for suspected pulmonary edema. Patient tolerated well and was discharged on supplemental oxygen after qualifying for home O2. Patient to follow-up with cardiology later this week for further care    Patient on day of discharge with a potassium of 2.7, patient was given supplementation while in the hospital with oral temptation to take while returning home. She was told to hold her Lasix today and continue tomorrow, continue daily potassium until seen by PCP. She has a prescription to follow-up at a [facility] for recheck of her electrolytes    Significant therapeutic interventions:   Heart Cath 6/5/2021  Findings  LMCA: Mild irregularities 10-20%. LAD: Patent stent in mid segment with 10-20% instent restenosis     LCx: Large vessel with mid 80% stenosis , reduced to 0% with PTCA/NEVILLE.      OM1 is small to intermediate caliber, patent with ostial 40% stenosis    RCA: Mid 100% occlusion, right to right and left to right Collaterals.      Conclusions:    1. Patent stent in LAD    2. Successful PCI / Drug Eluting Stent of the mid Circumflex Coronary    Artery.    3.  mid RCA with right to right and left to right collaterals     Significant Diagnostic Studies:   Labs / Micro:  CBC:   Lab Results   Component Value Date    WBC 11.7 06/03/2021    RBC 4.33 06/03/2021    HGB 13.7 06/03/2021    HCT 44.8 06/03/2021    .5 06/03/2021    MCH 31.6 06/03/2021    MCHC 30.6 06/03/2021    RDW 14.1 06/03/2021     06/05/2021     BMP:    Lab Results   Component Value Date    GLUCOSE 103 06/06/2021    GLUCOSE 139 06/01/2021     06/06/2021    K 2.7 06/06/2021    CL 95 06/06/2021    CO2 29 06/06/2021    ANIONGAP 12 06/06/2021    BUN 15 06/06/2021    CREATININE 0.63 06/06/2021    BUNCRER NOT REPORTED 06/06/2021    CALCIUM 7.3 06/06/2021    LABGLOM >60 06/06/2021    GFRAA >60 06/06/2021    GFR      06/06/2021    GFR NOT REPORTED 06/06/2021     HFP:    Lab Results   Component Value Date    PROT 5.8 06/03/2021     CMP:    Lab Results   Component Value Date    GLUCOSE 103 06/06/2021    GLUCOSE 139 06/01/2021     06/06/2021    K 2.7 06/06/2021    CL 95 06/06/2021    CO2 29 06/06/2021    BUN 15 06/06/2021    CREATININE 0.63 06/06/2021    ANIONGAP 12 06/06/2021    LABGLOM >60 06/06/2021    GFRAA >60 06/06/2021    GFR      06/06/2021    GFR NOT REPORTED 06/06/2021    PROT 5.8 06/03/2021    CALCIUM 7.3 06/06/2021      Radiology:  XR CHEST (SINGLE VIEW FRONTAL)    Result Date: 6/5/2021  1. Increased moderate to large right pleural effusion. Underlying right basilar airspace opacity is likely passive atelectasis, although superimposed pneumonia and aspiration are not excluded. 2. Unchanged small left pleural effusion and underlying left basilar passive atelectasis.  3. Pulmonary vascular congestion with possible central predominant edema, potentially congestive heart failure given suspected mild cardiomegaly and the above pleural effusions. US RENAL COMPLETE    Result Date: 6/3/2021  No acute abnormality identified. Mild renal cortical thinning bilaterally. XR CHEST PORTABLE    Result Date: 6/3/2021  Cardiomegaly with bilateral pleural effusions       Consultations:    Consults:     Final Specialist Recommendations/Findings:   IP CONSULT TO HEART FAILURE NURSE/COORDINATOR  IP CONSULT TO DIETITIAN  IP CONSULT TO CARDIOLOGY  IP CONSULT TO IV TEAM  IP CONSULT TO NEPHROLOGY      The patient was seen and examined on day of discharge and this discharge summary is in conjunction with any daily progress note from day of discharge. Discharge plan:     Disposition: Home    Physician Follow Up:     Javier Hodge  [address] [place] [address]  [phone]    Schedule an appointment as soon as possible for a visit in 1 week  hospital followup    Your cardiologist      hospital followup, go to scheduled appointment    [address]  [phone]  Schedule an appointment as soon as possible for a visit in 1 week  Schedule hospital follow up in 1-2 weeks with Cardiology post cath and assess heart failure.        Requiring Further Evaluation/Follow Up POST HOSPITALIZATION/Incidental Findings: none    Diet: cardiac diet    Activity: As tolerated    Instructions to Patient: none    Discharge Medications:      Medication List      START taking these medications    amiodarone 200 MG tablet  Commonly known as: CORDARONE  Take 1 tablet by mouth daily     apixaban 2.5 MG Tabs tablet  Commonly known as: ELIQUIS  Take 1 tablet by mouth 2 times daily     aspirin 81 MG EC tablet  Take 1 tablet by mouth daily  Replaces: aspirin 325 MG tablet     clopidogrel 75 MG tablet  Commonly known as: PLAVIX  Take 1 tablet by mouth daily     furosemide 40 MG tablet  Commonly known as: LASIX  Take 1 tablet by mouth daily     magnesium oxide 400 MG tablet  Commonly known as: MAG-OX  Take 1 tablet by mouth daily     potassium chloride 20 MEQ extended release tablet  Commonly known as: KLOR-CON M  Take 1 tablet by mouth daily        CHANGE how you take these medications    carvedilol 3.125 MG tablet  Commonly known as: COREG  Take 1 tablet by mouth 2 times daily (with meals)  What changed:   · medication strength  · how much to take        CONTINUE taking these medications    fludrocortisone 0.1 MG tablet  Commonly known as: FLORINEF     meclizine 25 MG tablet  Commonly known as: ANTIVERT     omeprazole 20 MG delayed release capsule  Commonly known as: PRILOSEC     pravastatin 10 MG tablet  Commonly known as: PRAVACHOL        STOP taking these medications    aspirin 325 MG tablet  Replaced by: aspirin 81 MG EC tablet           Where to Get Your Medications      These medications were sent to [address]  [address]    Phone: [phone]   · amiodarone 200 MG tablet  · apixaban 2.5 MG Tabs tablet  · aspirin 81 MG EC tablet  · carvedilol 3.125 MG tablet  · clopidogrel 75 MG tablet  · furosemide 40 MG tablet  · magnesium oxide 400 MG tablet  · potassium chloride 20 MEQ extended release tablet         Discharge Procedure Orders   Basic Metabolic Panel   Standing Status: Future Standing Exp. Date: 06/06/22       Time Spent on discharge is  39 mins in patient examination, evaluation, counseling as well as medication reconciliation, prescriptions for required medications, discharge plan and follow up. Electronically signed by   David Marquez DO  6/7/2021  7:12 AM      Thank you Dr. Caden Miranda for the opportunity to be involved in this patient's care.

## 2021-06-06 NOTE — PROGRESS NOTES
AST, ALT, ALB, BILITOT, ALKPHOS in the last 72 hours. Troponin:   No results for input(s): TROPHS in the last 72 hours. BNP: No results for input(s): BNP in the last 72 hours. Lipids:   No results for input(s): CHOL, HDL in the last 72 hours. Invalid input(s): LDLCALCU  INR: No results for input(s): INR in the last 72 hours. Objective:   Vitals: /60   Pulse 67   Temp 98.3 °F (36.8 °C) (Oral)   Resp 20   Ht 5' 3\" (1.6 m)   Wt 108 lb (49 kg)   SpO2 98%   BMI 19.13 kg/m²   General appearance: alert and cooperative with exam  HEENT: Head: Normocephalic, no lesions, without obvious abnormality. Neck: no JVD, trachea midline, no adenopathy  Lungs: Clear in upper lobes with diminished breath sounds in bases bilat. Supplemental oxygen in use. Heart: Regular rate and rhythm, s1/s2 auscultated, no murmurs. SR  Right radial site is soft. No drainage. No ecchymosis. Distal pulses palpable. Abdomen: soft, non-tender, bowel sounds active  Extremities: no edema  Neurologic: not done    ECHO: 6/01/2021   Left Ventricle : The left ventricle is normal size. Left ventricular      systolic function is moderately reduced. Regional wall motion      abnormalities are noted. There is  hypokinesis in the septal and      inferior walls. LVEF is estimated 35-40%.      Right Ventricle : Right ventricular systolic function is mildly      reduced.      Atria : Left atrium is mildly dilated. Right atrium is mildly dilated.      Aortic Valve : Aortic valve is calcified. No significant valvular      aortic stenosis.      Mitral Valve : Mitral valve leaflets are calcified. Moderate mitral      annular calcification. Mild mitral regurgitation.      Tricuspid Valve : The tricuspid valve is normal in structure. There      is trace tricuspid regurgitation. The right atrial pressure is      estimated at 3 mmHg. Right ventricular systolic pressure is estimated      at 48 mmHg.  There is mild pulmonary hypertension.      Stress Test:    Stress 7/2020  - possible fixed inferior defect, no reversible defect     CT chest 5/2021  Moderate pleural effusion  Emphysema  No PE    Echo 7/15/2020  Conclusion      Left Ventricle : LVEF is 45-50%.      Right Ventricle : Right ventricular systolic function is reduced.      Atria : The atrial septum is aneurysmal. Possible PFO is noted.      Aortic Valve : Aortic valve leaflets are sclerotic but open well.      Mitral Valve : Mitral valve leaflets are calcified. Moderate mitral      annular calcification. Mild mitral regurgitation.      Great Vessels : The aortic root is normal in size.      Pericardium : There is no pericardial effusion.       MAHI:  Structures:  LA: Mildly enlarged   SENG: No thrombus  RA: Normal  RV:  Normal  LV: Normal in size with normal systolic function   Estimated LVEF: 35%  Aorta: Mild atheromatous disease arch  Percardium: No pericardial effusion  Septum: PFO/ASD noted. Intracardiac left to right shunt via color Doppler. Intracardiac shunt via injection of agitated saline. Valves:  Mitral Valve: Structurally normal. Moderate regurgitation is identified. MR volume 25ml. MR ERO 0.14 cm2  Aortic Valve: The aortic valve is trileaflet and opens adequately. No regurgitiation is identified. Tricuspid valve: Structurally normal. No regurgitation is identified. Pulmonary valve: Normal. No significant regurgitation  No valvular vegetations or thrombus identified. Summary:   1. A MAHI was performed without complications. 2. Estimated LVEF 40%  3. No SENG thrombus   4. Moderate mitral regurgitation   5. Positive bubble study and color doppler suggesting PFO or a small ASD.      CARDIOVERSION:  After an adequate level of sedation was achieved, 150J in biphasic synchronized delivery was administered. conversion to normal sinus rhythm. The patient awoke without complications. A post procedure 12 L ECG was ordered. Cardiac Cath 6/4/2021  Findings:    LMCA: Mild irregularities 10-20%. LAD: Patent stent in mid segment with 10-20% instent restenosis     LCx: Large vessel with mid 80% stenosis , reduced to 0% with PTCA/NEVILLE. OM1 is small to intermediate caliber, patent with ostial 40% stenosis    RCA: Mid 100% occlusion, right to right and left to right Collaterals.      Conclusions:    1. Patent stent in LAD    2. Successful PCI / Drug Eluting Stent of the mid Circumflex Coronary    Artery.    3.  mid RCA with right to right and left to right collaterals      Recommendation:   Routine Post Stent Orders.    Medical therapy with asa, plavix and Eliquis. Will d/c ASA on discharge.    Optimize medical therapy for CMP    Repeat echo in 3 months to reassess LVEF       Assessment / Acute Cardiac Problems:   1. History of Afib with RVR. On Aspirin 325 mg at home  2. Acute systolic CHF  3. CAD with history of stents - [address] [facility]  Many years ago  4. Cardiomyopathy EF 35-40% decreased from 45-50% 7/2020    Patient Active Problem List:     Acute on chronic combined systolic (congestive) and diastolic (congestive) heart failure (HCC)     Atrial fibrillation with rapid ventricular response (HCC)     CAD (coronary artery disease)     Hypertension     Hyperlipidemia     Hypothyroidism     Type 2 diabetes mellitus (HCC)     Unstable angina (HCC)     Severe malnutrition (HCC)     ROB (acute kidney injury) ([facility]     Hyperkalemia      Plan of Treatment:     1. Afib RVR. S/P CV yesterday. Remains SR. Continue daily Amiodarone,BB, plavix and Eliquis. 2. MAHI as noted above. LVEF 35%. Will plan to reassess by echo in 3 months. 3. Acute systolic HF. Decreased EF to 35-40% compared to prior 7/2020. No overt signs of volume overload. Continue diuresis PO Lasix at home. Continue BB. Not able to add ACE due to allergy and intolerance. 4. Keep K+ > 4.0, and Mg+ > 2.0. K was 2.7 and  Mag 1.7 this am. Replacements ordered. 5. CAD s/p Cardiac Cath as noted above. Previous stent to LAD patent.  New NEVILLE to LCx and  to RCA with collaterals. Continue Eliquis, statin, BB and Plavix. 6. No objection to discharge to home with follow up with her Cardiologist in 1 week. BMP in 2 days due to electrolyte imbalance.       Electronically signed by KIMBERLY Spicer CNP on 6/6/2021 at 12:20 PM  [facility]      [phone]

## 2021-06-07 ENCOUNTER — CARE COORDINATION (OUTPATIENT)
Dept: CASE MANAGEMENT | Age: 81
End: 2021-06-07

## 2021-06-07 NOTE — CARE COORDINATION
Jacob [facility] Transitions Initial Follow Up Call    Call within 2 business days of discharge: Yes    Patient: Maged Pate Patient : 1940   MRN: [id number]  Reason for Admission: CHF  Discharge Date: 21 RARS: Readmission Risk Score: 18      Last Discharge [facility]       Complaint Diagnosis Description Type Department Provider    6/3/21  Acute on chronic combined systolic (congestive) and diastolic (congestive) heart failure [facility]) Admission (Discharged) [facility] CAR Daniela [address], DO           1st attempt to reach patient. Phone rings continually, no vm, unable to leave message. Facility: [facility]    Non-face-to-face services provided:  Obtained and reviewed discharge summary and/or continuity of care documents       Follow Up  No future appointments.     Fior Hyman RN

## 2021-06-08 ENCOUNTER — CARE COORDINATION (OUTPATIENT)
Dept: CASE MANAGEMENT | Age: 81
End: 2021-06-08

## 2021-06-08 NOTE — CARE COORDINATION
Jacob [facility] Transitions Initial Follow Up Call    Call within 2 business days of discharge: Yes    Patient: Jake Hurt Patient : 1940   MRN: [id number]  Reason for Admission: CHF  Discharge Date: 21 RARS: Readmission Risk Score: 18      Last Discharge [address] [address]       Complaint Diagnosis Description Type Department Provider    6/3/21  Acute on chronic combined systolic (congestive) and diastolic (congestive) heart failure [facility]) Admission (Discharged) ST[facility] CAR 2 Naif Kowalski DO           Unable to reach patient, final attempt. Phone rings continually, no answer, no vm. Emergency contact is same number. Episode resolved. Facility: [facility]    Follow Up  No future appointments.     Victorina Segundo RN

## 2022-02-14 LAB
ANION GAP SERPL CALCULATED.3IONS-SCNC: 13 MEQ/L (ref 5–13)
BUN BLDV-MCNC: 14 MG/DL (ref 7–18)
CALCIUM SERPL-MCNC: 8.9 MG/DL (ref 8.4–10.2)
CHLORIDE BLD-SCNC: 107 MMOL/L (ref 98–107)
CO2: 22 MMOL/L (ref 23–31)
CREAT SERPL-MCNC: 0.83 MG/DL (ref 0.57–1.11)
GFR CALCULATED: >60
GLUCOSE BLD-MCNC: 121 MG/DL (ref 70–100)
POTASSIUM SERPL-SCNC: 3.9 MMOL/L (ref 3.5–5.1)
SODIUM BLD-SCNC: 142 MMOL/L (ref 136–145)

## 2022-03-10 RX ORDER — FLUDROCORTISONE ACETATE 0.1 MG/1
TABLET ORAL
Qty: 90 TABLET | OUTPATIENT
Start: 2022-03-10

## 2022-06-13 LAB — TROPONIN I: 0.03 NG/ML (ref 0.01–0.04)
